# Patient Record
Sex: FEMALE | Race: WHITE | NOT HISPANIC OR LATINO | Employment: OTHER | URBAN - METROPOLITAN AREA
[De-identification: names, ages, dates, MRNs, and addresses within clinical notes are randomized per-mention and may not be internally consistent; named-entity substitution may affect disease eponyms.]

---

## 2019-12-06 ENCOUNTER — TELEPHONE (OUTPATIENT)
Dept: TRANSPLANT | Facility: CLINIC | Age: 49
End: 2019-12-06

## 2019-12-06 DIAGNOSIS — Z79.899 POLYPHARMACY: Primary | ICD-10-CM

## 2019-12-06 DIAGNOSIS — I27.9 CHRONIC PULMONARY HEART DISEASE: Primary | ICD-10-CM

## 2019-12-06 DIAGNOSIS — R06.82 TACHYPNEA: ICD-10-CM

## 2019-12-06 DIAGNOSIS — Z79.899 POLYPHARMACY: ICD-10-CM

## 2019-12-06 NOTE — TELEPHONE ENCOUNTER
----- Message from Selma Savage sent at 12/6/2019  8:28 AM CST -----  Contact:  Austin/ nurse   Please view message below.    Thanks,    Selma  ----- Message -----  From: Marycarmen Zimmer MA  Sent: 12/6/2019   8:12 AM CST  To: Selma Savage    Please call Austin at [phone] she need an appointment Dr. Seth Alexander referral the patient she will be a new patient Pulmonary Hypertension . Please call [phone]. Thank you.

## 2019-12-06 NOTE — TELEPHONE ENCOUNTER
Attempted to contact pt to advise of appointment, but voicemail is full. Appt slips were sent to JOY Avila, who will provide to patient, which include my direct contact info, should the patient have any questions.

## 2019-12-06 NOTE — TELEPHONE ENCOUNTER
"Returned call to Austin, who states referring physician's original plan was to transfer pt to [facility], as pt is currently hospitalized but "physician Dr Alexander (referring) spoke to said 'no'". Austin states that Dr Alexander was "very concerned" about pt, since her symptoms were "new and acute". Advised Austin that if pt is discharged and is still symptomatic, she could come to ER at [facility]. In meantime, Austin will fax records and we will arrange for PH Consult.   "

## 2019-12-16 PROBLEM — I10 ESSENTIAL HYPERTENSION: Status: ACTIVE | Noted: 2019-12-16

## 2019-12-16 PROBLEM — I27.20 PULMONARY HYPERTENSION: Status: ACTIVE | Noted: 2019-12-16

## 2019-12-16 PROBLEM — Z91.148 HISTORY OF MEDICATION NONCOMPLIANCE: Status: ACTIVE | Noted: 2019-12-16

## 2019-12-16 PROBLEM — F12.10 MARIJUANA ABUSE: Status: ACTIVE | Noted: 2019-12-16

## 2019-12-16 PROBLEM — F15.10 METHAMPHETAMINE USE: Status: ACTIVE | Noted: 2019-12-16

## 2019-12-16 PROBLEM — F41.8 OTHER SPECIFIED ANXIETY DISORDERS: Status: ACTIVE | Noted: 2019-12-16

## 2019-12-16 PROBLEM — Z72.0 TOBACCO USE: Status: ACTIVE | Noted: 2019-12-16

## 2019-12-16 NOTE — PROGRESS NOTES
Subjective:    Patient ID:  Selma Hartmann is a 49 y.o. female who presents for evaluation of Pulmonary Hypertension.    HPI   48 yo with methamphetamine use (urine tox + OSH 12/3/19), MJ use, tob use HTN, DM2, HLP, chart history of med noncompliance, h/o prescription opiate addiction, anxiety, h/o DVT after tubal ligation 12 yr ago,   Referred by Dr Alexander   For eval of PH    Pt reports she was working in the [employer] dept at [employer] and was having trouble lifting boxes and walking from once side of the store to the other- missed work and went to the doctor for a work excuse when she woke with swelling in her face, and he put her on diuretic. Was then admitted locally after an ER visit for SOB/swelling- given lopressor and HCTZ which made her feel worse (her bnp was over 2000 and bp was 180/140)- was started on lasix and starting urinating a lot. Was told she had PH at this time though she had not had a RHC yet. Was sent here for further eval-    reports she has lost a lot of fluid since her hosp stay- her face is less full, people have commented on how good she looks- taking 40mg lasix daily- doesn't urinate a lot any more though she did a lot when they first started it. Doesn't do a lot physically- was told not to lift over 5#, but able to get things done a lot faster and doesn't run out of breath like she was- was gasping before. No SOB getting dressed or showering now, though she was before. Was unable to walk the dollar store before- did not have to stop and catch her breath on the way to the lab, and on her 6mw only stopped once for a second or two. No CP, though at times she feels anxious. No swelling in her feet or ankles now though she had a lot of swelling before.    Had stroke in 2007- was to be started on bp med but didn't take it. 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 Had been using for the last 9 years and this is the longest she has been clean- says she has no desire to use, thinks she was self medicating- has kicked people out of her house who offered it to her.  Has held a cigarette 3 times since her hosp d/c- puffed but didn't inhale)  Relays on medicaid transport    + raynauds- says she tested + for lupus at the time of her stroke but then was told she didn't have it    Watching her Na    FH: F HTN  M HTN  S: crohns, HTN    Six Minute Walk Test:   335  m (   m in    )                                              O2 sat  92 ->90  %                                                           HR 92  -> 98                                                                 BP  117 / 74  ->120 /80                                                         Layla   0.5  -> 4    Echo      12/2/19  Normal LV size, LV thickness mild-mod increased, EF 55-60%  E/A reversal  RV severely dilated, D shaped septum  Normal LA  Mild ISSA  RVSP 110  No effusion  RAP 15    EKG  12 / 3   /19  NSR( I viewed tracing)  bettie  rward axis irbbb    RHC   /      /  n/a    CXR   /    /  (-) OSH    CT Chest  12  /  3  /   19  (-) PE, main PA 3.5 cm, normal lung parenchyma    PFTs    12 / 4 /19    FVC    3.3  L  98    % pred  FEV1   2.47 L   92  % pred  FEV1/FVC  75    %  TLC  3.99 L 84 %pred  DLCOadj   73  % pred     V/Q Scan  /    /  n/a    Review of Systems   Constitution: Positive for weight loss. Negative for chills, fever, malaise/fatigue and weight gain.   HENT: Negative.    Eyes: Negative.    Cardiovascular: Positive for dyspnea on exertion and leg swelling. Negative for chest pain, near-syncope, orthopnea, palpitations, paroxysmal nocturnal  "dyspnea and syncope.   Respiratory: Negative for cough and shortness of breath.    Endocrine: Negative.    Skin: Negative.    Musculoskeletal: Negative.    Gastrointestinal: Negative for bloating, abdominal pain and change in bowel habit.   Neurological: Negative for dizziness and light-headedness.   Psychiatric/Behavioral: Negative for depression. The patient is nervous/anxious.         Objective:  /73 (BP Location: Left arm, Patient Position: Sitting, BP Method: Large (Automatic))   Pulse 92   Ht 5' 3" (1.6 m)   Wt 73 kg (160 lb 15 oz)   BMI 28.51 kg/m²       Physical Exam   Constitutional: She is oriented to person, place, and time. She appears well-developed and well-nourished.   HENT:   Head: Normocephalic and atraumatic.   Eyes: Right eye exhibits no discharge. Left eye exhibits no discharge.   Neck: Neck supple. No JVD present. No thyromegaly present.   Cardiovascular: Regular rhythm. Exam reveals no gallop and no friction rub.   No murmur heard.  Mildly tachy, loud S 2   Pulmonary/Chest: Effort normal and breath sounds normal. No respiratory distress. She has no wheezes. She has no rales.   Abdominal: Soft. Bowel sounds are normal. She exhibits no distension. There is no tenderness.   Musculoskeletal: Normal range of motion. She exhibits edema. She exhibits no tenderness.   1+ pitting edema to sock line   Neurological: She is alert and oriented to person, place, and time. No cranial nerve deficit. Coordination normal.   Skin: Skin is warm and dry. No rash noted.   Psychiatric: She has a normal mood and affect. Judgment and thought content normal.           Chemistry        Component Value Date/Time     12/17/2019 1241    K 4.5 12/17/2019 1241     12/17/2019 1241    CO2 26 12/17/2019 1241    BUN 22 (H) 12/17/2019 1241    CREATININE 1.1 12/17/2019 1241     (H) 12/17/2019 1241        Component Value Date/Time    CALCIUM 10.0 12/17/2019 1241    ALKPHOS 117 12/17/2019 1241    AST 27 " 12/17/2019 1241    ALT 31 12/17/2019 1241    BILITOT 0.9 12/17/2019 1241    ESTGFRAFRICA >60.0 12/17/2019 1241    EGFRNONAA 59.1 (A) 12/17/2019 1241            Magnesium   Date Value Ref Range Status   12/17/2019 2.3 1.6 - 2.6 mg/dL Final       Lab Results   Component Value Date    WBC 9.39 12/17/2019    HGB 18.5 (H) 12/17/2019    HCT 56.6 (H) 12/17/2019    MCV 95 12/17/2019     12/17/2019       No results found for: INR, PROTIME    BNP   Date Value Ref Range Status   12/17/2019 43 0 - 99 pg/mL Final     Comment:     Values of less than 100 pg/ml are consistent with non-CHF populations.       No results found for: LDH          Assessment:       1. Pulmonary hypertension    2. Methamphetamine use    3. Marijuana abuse    4. Essential hypertension    5. History of medication noncompliance    6. Other specified anxiety disorders    7. Tobacco use    8. Type 2 diabetes mellitus without complication, without long-term current use of insulin    9. Mixed hyperlipidemia      WHO Group:1  Functional Class 3     Plan:     severe PH by echo with RVE and D shaped septum, has not yet had RHC to confirm dx. Most likely cause is methamphetamine use, however pt has raynauds making CTD another possibility.     Mild volume on exam today with normal BNP after initiation of lasix and systemic HTN now controlled.    PLAN: f/u with me week after Xmas for RHC, V/Q and lap panel to include rheum panel, HIV, TSH, hep panel    Strongly encouraged pt to to go to  to  Help her remain free of meth (as a colleague from [facility] who is a  )    Cont smoking cessation efforts as well    Keep salt intake to under 2000 mg sodium, fluids to under 2 L (64 oz)    Further recs will follow above testing- more likely than not will start with po pulmonary vasodilators as she is very high risk for parenteral therapy given her psychosocial issues.

## 2019-12-16 NOTE — H&P (VIEW-ONLY)
Subjective:    Patient ID:  Selma Hartmann is a 49 y.o. female who presents for evaluation of Pulmonary Hypertension.    HPI   50 yo with methamphetamine use (urine tox + OSH 12/3/19), MJ use, tob use HTN, DM2, HLP, chart history of med noncompliance, h/o prescription opiate addiction, anxiety, h/o DVT after tubal ligation 12 yr ago,   Referred by Dr Alexander   For eval of PH    Pt reports she was working in the [employer] dept at [facility] and was having trouble lifting boxes and walking from once side of the store to the other- missed work and went to the doctor for a work excuse when she woke with swelling in her face, and he put her on diuretic. Was then admitted locally after an ER visit for SOB/swelling- given lopressor and HCTZ which made her feel worse (her bnp was over 2000 and bp was 180/140)- was started on lasix and starting urinating a lot. Was told she had PH at this time though she had not had a RHC yet. Was sent here for further eval-    reports she has lost a lot of fluid since her hosp stay- her face is less full, people have commented on how good she looks- taking 40mg lasix daily- doesn't urinate a lot any more though she did a lot when they first started it. Doesn't do a lot physically- was told not to lift over 5#, but able to get things done a lot faster and doesn't run out of breath like she was- was gasping before. No SOB getting dressed or showering now, though she was before. Was unable to walk the dollar store before- did not have to stop and catch her breath on the way to the lab, and on her 6mw only stopped once for a second or two. No CP, though at times she feels anxious. No swelling in her feet or ankles now though she had a lot of swelling before.    Had stroke in 2007- was to be started on bp med but didn't take it. 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 Had been using for the last 9 years and this is the longest she has been clean- says she has no desire to use, thinks she was self medicating- has kicked people out of her house who offered it to her.  Has held a cigarette 3 times since her hosp d/c- puffed but didn't inhale)  Relays on medicaid transport    + raynauds- says she tested + for lupus at the time of her stroke but then was told she didn't have it    Watching her Na    FH: F HTN  M HTN  S: crohns, HTN    Six Minute Walk Test:   335  m (   m in    )                                              O2 sat  92 ->90  %                                                           HR 92  -> 98                                                                 BP  117 / 74  ->120 /80                                                         Layla   0.5  -> 4    Echo      12/2/19  Normal LV size, LV thickness mild-mod increased, EF 55-60%  E/A reversal  RV severely dilated, D shaped septum  Normal LA  Mild ISSA  RVSP 110  No effusion  RAP 15    EKG  12 / 3   /19  NSR( I viewed tracing)  bettie  rward axis irbbb    RHC   /      /  n/a    CXR   /    /  (-) OSH    CT Chest  12  /  3  /   19  (-) PE, main PA 3.5 cm, normal lung parenchyma    PFTs    12 / 4 /19    FVC    3.3  L  98    % pred  FEV1   2.47 L   92  % pred  FEV1/FVC  75    %  TLC  3.99 L 84 %pred  DLCOadj   73  % pred     V/Q Scan  /    /  n/a    Review of Systems   Constitution: Positive for weight loss. Negative for chills, fever, malaise/fatigue and weight gain.   HENT: Negative.    Eyes: Negative.    Cardiovascular: Positive for dyspnea on exertion and leg swelling. Negative for chest pain, near-syncope, orthopnea, palpitations, paroxysmal nocturnal  "dyspnea and syncope.   Respiratory: Negative for cough and shortness of breath.    Endocrine: Negative.    Skin: Negative.    Musculoskeletal: Negative.    Gastrointestinal: Negative for bloating, abdominal pain and change in bowel habit.   Neurological: Negative for dizziness and light-headedness.   Psychiatric/Behavioral: Negative for depression. The patient is nervous/anxious.         Objective:  /73 (BP Location: Left arm, Patient Position: Sitting, BP Method: Large (Automatic))   Pulse 92   Ht 5' 3" (1.6 m)   Wt 73 kg (160 lb 15 oz)   BMI 28.51 kg/m²       Physical Exam   Constitutional: She is oriented to person, place, and time. She appears well-developed and well-nourished.   HENT:   Head: Normocephalic and atraumatic.   Eyes: Right eye exhibits no discharge. Left eye exhibits no discharge.   Neck: Neck supple. No JVD present. No thyromegaly present.   Cardiovascular: Regular rhythm. Exam reveals no gallop and no friction rub.   No murmur heard.  Mildly tachy, loud S 2   Pulmonary/Chest: Effort normal and breath sounds normal. No respiratory distress. She has no wheezes. She has no rales.   Abdominal: Soft. Bowel sounds are normal. She exhibits no distension. There is no tenderness.   Musculoskeletal: Normal range of motion. She exhibits edema. She exhibits no tenderness.   1+ pitting edema to sock line   Neurological: She is alert and oriented to person, place, and time. No cranial nerve deficit. Coordination normal.   Skin: Skin is warm and dry. No rash noted.   Psychiatric: She has a normal mood and affect. Judgment and thought content normal.           Chemistry        Component Value Date/Time     12/17/2019 1241    K 4.5 12/17/2019 1241     12/17/2019 1241    CO2 26 12/17/2019 1241    BUN 22 (H) 12/17/2019 1241    CREATININE 1.1 12/17/2019 1241     (H) 12/17/2019 1241        Component Value Date/Time    CALCIUM 10.0 12/17/2019 1241    ALKPHOS 117 12/17/2019 1241    AST 27 " 12/17/2019 1241    ALT 31 12/17/2019 1241    BILITOT 0.9 12/17/2019 1241    ESTGFRAFRICA >60.0 12/17/2019 1241    EGFRNONAA 59.1 (A) 12/17/2019 1241            Magnesium   Date Value Ref Range Status   12/17/2019 2.3 1.6 - 2.6 mg/dL Final       Lab Results   Component Value Date    WBC 9.39 12/17/2019    HGB 18.5 (H) 12/17/2019    HCT 56.6 (H) 12/17/2019    MCV 95 12/17/2019     12/17/2019       No results found for: INR, PROTIME    BNP   Date Value Ref Range Status   12/17/2019 43 0 - 99 pg/mL Final     Comment:     Values of less than 100 pg/ml are consistent with non-CHF populations.       No results found for: LDH          Assessment:       1. Pulmonary hypertension    2. Methamphetamine use    3. Marijuana abuse    4. Essential hypertension    5. History of medication noncompliance    6. Other specified anxiety disorders    7. Tobacco use    8. Type 2 diabetes mellitus without complication, without long-term current use of insulin    9. Mixed hyperlipidemia      WHO Group:1  Functional Class 3     Plan:     severe PH by echo with RVE and D shaped septum, has not yet had RHC to confirm dx. Most likely cause is methamphetamine use, however pt has raynauds making CTD another possibility.     Mild volume on exam today with normal BNP after initiation of lasix and systemic HTN now controlled.    PLAN: f/u with me week after Xmas for RHC, V/Q and lap panel to include rheum panel, HIV, TSH, hep panel    Strongly encouraged pt to to go to  to  Help her remain free of meth (as a colleague from [facility] who is a  )    Cont smoking cessation efforts as well    Keep salt intake to under 2000 mg sodium, fluids to under 2 L (64 oz)    Further recs will follow above testing- more likely than not will start with po pulmonary vasodilators as she is very high risk for parenteral therapy given her psychosocial issues.

## 2019-12-17 ENCOUNTER — OFFICE VISIT (OUTPATIENT)
Dept: TRANSPLANT | Facility: CLINIC | Age: 49
End: 2019-12-17
Payer: MEDICAID

## 2019-12-17 ENCOUNTER — HOSPITAL ENCOUNTER (OUTPATIENT)
Dept: PULMONOLOGY | Facility: CLINIC | Age: 49
Discharge: HOME | End: 2019-12-17
Payer: MEDICAID

## 2019-12-17 VITALS
BODY MASS INDEX: 28.52 KG/M2 | HEIGHT: 63 IN | DIASTOLIC BLOOD PRESSURE: 73 MMHG | HEIGHT: 63 IN | WEIGHT: 160.94 LBS | WEIGHT: 155 LBS | HEART RATE: 92 BPM | BODY MASS INDEX: 27.46 KG/M2 | SYSTOLIC BLOOD PRESSURE: 104 MMHG

## 2019-12-17 DIAGNOSIS — Z72.0 TOBACCO USE: ICD-10-CM

## 2019-12-17 DIAGNOSIS — Z01.812 PRE-PROCEDURE LAB EXAM: Primary | ICD-10-CM

## 2019-12-17 DIAGNOSIS — I10 ESSENTIAL HYPERTENSION: ICD-10-CM

## 2019-12-17 DIAGNOSIS — F41.8 OTHER SPECIFIED ANXIETY DISORDERS: ICD-10-CM

## 2019-12-17 DIAGNOSIS — I73.00 RAYNAUD'S DISEASE WITHOUT GANGRENE: ICD-10-CM

## 2019-12-17 DIAGNOSIS — F12.10 MARIJUANA ABUSE: ICD-10-CM

## 2019-12-17 DIAGNOSIS — I27.20 PULMONARY HYPERTENSION: ICD-10-CM

## 2019-12-17 DIAGNOSIS — E11.9 TYPE 2 DIABETES MELLITUS WITHOUT COMPLICATION, WITHOUT LONG-TERM CURRENT USE OF INSULIN: ICD-10-CM

## 2019-12-17 DIAGNOSIS — E78.2 MIXED HYPERLIPIDEMIA: ICD-10-CM

## 2019-12-17 DIAGNOSIS — I27.9 CHRONIC PULMONARY HEART DISEASE: ICD-10-CM

## 2019-12-17 DIAGNOSIS — Z91.148 HISTORY OF MEDICATION NONCOMPLIANCE: ICD-10-CM

## 2019-12-17 DIAGNOSIS — F15.10 METHAMPHETAMINE USE: ICD-10-CM

## 2019-12-17 PROBLEM — E78.5 HYPERLIPIDEMIA: Status: ACTIVE | Noted: 2019-12-17

## 2019-12-17 PROCEDURE — 99205 PR OFFICE/OUTPT VISIT, NEW, LEVL V, 60-74 MIN: ICD-10-PCS | Mod: S$PBB,,, | Performed by: INTERNAL MEDICINE

## 2019-12-17 PROCEDURE — 94618 PULMONARY STRESS TESTING: ICD-10-PCS | Mod: 26,S$PBB,, | Performed by: INTERNAL MEDICINE

## 2019-12-17 PROCEDURE — 99205 OFFICE O/P NEW HI 60 MIN: CPT | Mod: S$PBB,,, | Performed by: INTERNAL MEDICINE

## 2019-12-17 PROCEDURE — 94618 PULMONARY STRESS TESTING: CPT | Mod: 26,S$PBB,, | Performed by: INTERNAL MEDICINE

## 2019-12-17 PROCEDURE — 99999 PR PBB SHADOW E&M-EST. PATIENT-LVL V: CPT | Mod: PBBFAC,,, | Performed by: INTERNAL MEDICINE

## 2019-12-17 PROCEDURE — 94618 PULMONARY STRESS TESTING: CPT | Mod: PBBFAC | Performed by: INTERNAL MEDICINE

## 2019-12-17 PROCEDURE — 99999 PR PBB SHADOW E&M-EST. PATIENT-LVL V: ICD-10-PCS | Mod: PBBFAC,,, | Performed by: INTERNAL MEDICINE

## 2019-12-17 PROCEDURE — 99215 OFFICE O/P EST HI 40 MIN: CPT | Mod: PBBFAC,25 | Performed by: INTERNAL MEDICINE

## 2019-12-17 RX ORDER — NIFEDIPINE 60 MG/1
60 TABLET, EXTENDED RELEASE ORAL DAILY
COMMUNITY
Start: 2019-12-06 | End: 2020-02-18 | Stop reason: ALTCHOICE

## 2019-12-17 RX ORDER — LISINOPRIL 20 MG/1
20 TABLET ORAL DAILY
COMMUNITY
Start: 2019-12-06 | End: 2020-03-20 | Stop reason: SDUPTHER

## 2019-12-17 RX ORDER — FUROSEMIDE 40 MG/1
40 TABLET ORAL DAILY
COMMUNITY
Start: 2019-12-06

## 2019-12-17 RX ORDER — ACETAMINOPHEN 325 MG/1
325 TABLET ORAL EVERY 6 HOURS PRN
COMMUNITY
End: 2020-07-22

## 2019-12-17 RX ORDER — ZOLPIDEM TARTRATE 5 MG/1
5 TABLET ORAL NIGHTLY
COMMUNITY
Start: 2019-12-06 | End: 2023-04-25

## 2019-12-17 RX ORDER — ASPIRIN 81 MG/1
81 TABLET ORAL DAILY
COMMUNITY
End: 2023-09-21

## 2019-12-17 NOTE — PATIENT INSTRUCTIONS
We will do a right heart catheterization to measure the blood pressure in your lungs-  Take your usual medicines and eat a light breakfast that am.  Labs on 2nd floor- then go to third floor cath lab waiting area and check in    We will do a ventilation perfusion scan the same day    Keep working on staying clean, i'm so proud of you- really think about going to NA, I think it will help!    Keep salt intake to under 2000 mg sodium, fluids to under 2 L (64 oz)    Call us if you find yourself getting more short of breath, have more swelling or unexpected weight changes    PH on Facebook: A couple of our patients created a group on Facebook for people living in [place] with PH, it's called [place] [facility].    Check it out!

## 2019-12-17 NOTE — LETTER
December 17, 2019        LESA SOMMERS  [address] [place] [address]             [facility] [address]  [facility] [address]  Phone: [phone]   Patient: Selma Hartmann   MR Number: [id number]   YOB: 1970   Date of Visit: 12/17/2019       Dear Dr. Sommers:    Thank you for referring Selma Hartmann to me for evaluation. Attached you will find relevant portions of my assessment and plan of care.    If you have questions, please do not hesitate to call me. I look forward to following Selma Hartmann along with you.    Sincerely,      Palma Herrera MD            CC  No Recipients    Enclosure

## 2019-12-18 NOTE — PROCEDURES
Selma Hartmann is a 49 y.o.  female patient, who presents for a 6 minute walk test ordered by MD Sharon.  The diagnosis is Pulmonary Hypertension, Qualify for Oxygen.  The patient's BMI is 27.5 kg/m2.  Predicted distance (lower limit of normal) is 420.73 meters.      Test Results:    The test was completed with stops.  The patient stopped 1 times for a total of 16 seconds.  The total time walked was 344 seconds.  During walking, the patient reported:  Dyspnea. The patient used no assistive devices  during testing.     12/17/2019---------Distance: 335.28 meters (1100 feet)     O2 Sat % Supplemental Oxygen Heart Rate Blood Pressure Layla Scale   Pre-exercise  (Resting) 92 % Room Air 92 bpm 117/74 mmHg 0.5   During Exercise 90 % Room Air 98 bpm 120/80 mmHg 4   Post-exercise  (Recovery) 92 % Room Air  91 bpm   mmHg       Recovery Time: 154 seconds    Performing nurse/tech: Mikhail DON      PREVIOUS STUDY:   The patient has not had a previous study.      CLINICAL INTERPRETATION:  Six minute walk distance is 335.28 meters (1100 feet) with somewhat heavy dyspnea.  During exercise, there was no significant desaturation while breathing room air.  Both blood pressure and heart rate remained stable with walking.  The patient did not report non-pulmonary symptoms during exercise.  No previous study performed.  Based upon age and body mass index, exercise capacity is less than predicted.

## 2019-12-19 ENCOUNTER — TELEPHONE (OUTPATIENT)
Dept: TRANSPLANT | Facility: CLINIC | Age: 49
End: 2019-12-19

## 2019-12-19 NOTE — TELEPHONE ENCOUNTER
----- Message from Mary Quiroz sent at 12/19/2019 10:45 AM CST -----  Contact: pt  Pt would like a call in ref to her procedure appt, and she states she is suppose to have labs before.    Thanks

## 2019-12-23 ENCOUNTER — HOSPITAL ENCOUNTER (OUTPATIENT)
Facility: HOSPITAL | Age: 49
Discharge: HOME | End: 2019-12-23
Attending: INTERNAL MEDICINE | Admitting: INTERNAL MEDICINE
Payer: MEDICAID

## 2019-12-23 DIAGNOSIS — I27.20 PULMONARY HYPERTENSION: ICD-10-CM

## 2019-12-23 PROCEDURE — 93451 RIGHT HEART CATH: CPT | Mod: 26,,, | Performed by: INTERNAL MEDICINE

## 2019-12-23 PROCEDURE — 25000003 PHARM REV CODE 250: Performed by: INTERNAL MEDICINE

## 2019-12-23 PROCEDURE — 99900035 HC TECH TIME PER 15 MIN (STAT)

## 2019-12-23 PROCEDURE — 93463 DRUG ADMIN & HEMODYNMIC MEAS: CPT

## 2019-12-23 PROCEDURE — C1894 INTRO/SHEATH, NON-LASER: HCPCS | Performed by: INTERNAL MEDICINE

## 2019-12-23 PROCEDURE — 93463 PR MEDICATION ADMIN & HEMODYNAMIC MEASURMENT: ICD-10-PCS | Mod: ,,, | Performed by: INTERNAL MEDICINE

## 2019-12-23 PROCEDURE — 93463 DRUG ADMIN & HEMODYNMIC MEAS: CPT | Mod: ,,, | Performed by: INTERNAL MEDICINE

## 2019-12-23 PROCEDURE — 93451 PR RIGHT HEART CATH O2 SATURATION & CARDIAC OUTPUT: ICD-10-PCS | Mod: 26,,, | Performed by: INTERNAL MEDICINE

## 2019-12-23 PROCEDURE — 93451 RIGHT HEART CATH: CPT | Performed by: INTERNAL MEDICINE

## 2019-12-23 PROCEDURE — C1751 CATH, INF, PER/CENT/MIDLINE: HCPCS | Performed by: INTERNAL MEDICINE

## 2019-12-23 PROCEDURE — 93463 DRUG ADMIN & HEMODYNMIC MEAS: CPT | Performed by: INTERNAL MEDICINE

## 2019-12-23 RX ORDER — SODIUM CHLORIDE 0.9 G/100ML
IRRIGANT IRRIGATION
Status: DISCONTINUED | OUTPATIENT
Start: 2019-12-23 | End: 2019-12-23 | Stop reason: HOSPADM

## 2019-12-23 RX ORDER — LIDOCAINE HYDROCHLORIDE 20 MG/ML
INJECTION, SOLUTION INFILTRATION; PERINEURAL
Status: DISCONTINUED | OUTPATIENT
Start: 2019-12-23 | End: 2019-12-23 | Stop reason: HOSPADM

## 2019-12-23 NOTE — Clinical Note
The PA catheter is repositioned to the main pulmonary artery. Hemodynamics performed. Cardiac output obtained at 4 L/min. O2 saturation measured at 69%.

## 2019-12-23 NOTE — DISCHARGE INSTRUCTIONS
We are going to start you on adempas 1 mg three times a day.     The medicine will come to you through a specialty pharmacy that will ship the drug to  Your house and a specialty pharmacy nurse will come check on you as we increase the dose.    Take it with food to reduce indigestion.      A couple of weeks later we will start another medicine for the pulmonary hypertension called opsumit. Take one 10mg tablet once a day with your morning medicines.      AFTER THE PROCEDURE:  -You may remove the bandage in 24 hours and wash with soap and water.  -You may shower, but do not soak in a tub for three days.   PRECAUTIONS FOR THE NEXT 24 HOURS:  -If you need to cough, sneeze, have a bowel movement, or bear down, hold pressure over your bandage.  -Do not  anything heavier than a gallon of milk(about 5 pounds)  -Avoid excessive bending over.  SYMPTOMS TO WATCH FOR AND REPORT TO YOUR DOCTOR:  -BLEEDING: hold pressure over the site until bleeding stops. Proceed to Emergency Room by ambulance (do not drive yourself) if unable to stop bleeding. Notify your doctor.  -HEMATOMA(hard bruise under the skin): Narciso around the bruise if one develops. Call your doctor if it increases in size or if you have difficulty talking, swallowing, breathing or anything unusual.  SIGNS OF INFECTION:Fever (temperature over 100.5 F), pus or redness  -RASH  -CHEST PAIN OR SHORTNESS OF BREATH    You may call you coordinator in the Heart Failure/Heart Transplant/Pulmonary Hypertension Clinic at [phone] during normal business hours(Monday through Friday from 8 A.M. to 5 P.M.) After hours, call the Heart Transplant Service doctor on call at [phone].

## 2019-12-23 NOTE — INTERVAL H&P NOTE
The patient has been examined and the H&P has been reviewed:    Here for RHC to assess PAP.     RHC R IJ, 7 Fr sheath with local lidocaine, micropuncture kit and US guidance.    I have explained the risks, benefits and alternatives of the procedure in detail. The patient voices understanding and all questions have been answered,. The patient agrees to proceed as planned.        There are no hospital problems to display for this patient.

## 2019-12-23 NOTE — DISCHARGE SUMMARY
Date of admit to cath lab: 12/23/2019      Date of discharge from cath lab: 12/23/2019      Principal diagnosis: PH    Discharge attending physician: Palma Herrera MD    Hospital Course/Outcome of the treatment, procedures or surgery: Pt admitted for RHC.   See CVIS/cath lab procedure report in EPIC  for full report of today's procedure.    Disposition of the case (d/c disposition): home    Discharge Medication List: see med card    Plan for follow up care, diet, activity level: F/U as scheduled. Resume low Na diet.  Activity as tolerated    Condition on discharge from Cath lab: Stable.

## 2019-12-24 ENCOUNTER — TELEPHONE (OUTPATIENT)
Dept: TRANSPLANT | Facility: CLINIC | Age: 49
End: 2019-12-24

## 2019-12-24 NOTE — TELEPHONE ENCOUNTER
Late entry for 12/23:  Spoke to pt and SO regarding initiation of Adempas. The following items were discussed:    This medication is one approach to help bring down your pulmonary pressures (pulmonary hypertension, or high blood pressure in your lungs). The  has a great website about the medication, but here are a few things to know about it:     1. The Adempas will be supplied to you by a specialty pharmacy. I will send the referral for your medication to the  of the medication ([facility]) and they will contact you and eventually send the medication referral to the pharmacy. They will tell you about the copay for the med, if there is one at all. If it is too high for you, please let them know this. They can triage you to patient assistance programs, but they can't do so unless you say something.     2. After the medication is shipped to you, please do NOT start it right away. Please wait to hear from a specialty pharmacy nurse (likely from either [facility] or [facility], but possibly [facility]), who will set up an appointment with you to come to your home to do teaching and assess you for readiness to start the medication (blood pressure, etc). Every few weeks, the nurse will make a visit with you to see how you are doing, and to determine if we can increase the dose. The tablet strengths are 0.5mg, 1.0 mg, 1.5 mg, 2.0 mg and 2.5 mg. You will likely start at 1mg. Once you've reached the goal dose, you won't likely need additional visits from the nurse, except for maintenance visits.     3. Possible side effects might include low blood pressure, headache, indigestion, dizziness, nosebleeds. Some people don't experience any side effects at all. Please call us with anything that is concerning for you. If you need to take an antacid, please take it one hour before or one hour after the Adempas, as they can lessen the effect of Adempas.     4. If you are ever hospitalized at a facility other  "than [facility] while taking Adempas, you will need to bring the medication with you, in case it is not on their formulary. Missing more than 3 days of the medication means having to restart at the lowest dose and titrate up again.     5. The specialty pharmacy will need to make contact with you monthly to arrange shipment of the medication.     Pt was provided w/my direct contact info, as well as Adempas informational bookelt and med guide. She was also given info on [place] [facility] (lives about 1 1/2 hours away) and info on the PHA. She is not of childbearing potential (considered post-menopausal). All questions/concerns answered addressed to pt satisfaction. Of note, this RN also spoke w/pt's father via telephone (w/pt permission),  who expressed concern about pt and requested she be admitted for "observartion". It was noted to pt's dad that pt was deemed clinically stable, per Dr Herrera, for discharge. He requested this RN ask Dr Herrera to call him, which was done, but without promise that she would be able, given her schedule in the procedure room. Pt expressed full understanding, as did her father.      "

## 2019-12-27 ENCOUNTER — TELEPHONE (OUTPATIENT)
Dept: TRANSPLANT | Facility: CLINIC | Age: 49
End: 2019-12-27

## 2019-12-27 DIAGNOSIS — I27.9 CHRONIC PULMONARY HEART DISEASE: Primary | ICD-10-CM

## 2019-12-27 NOTE — TELEPHONE ENCOUNTER
Request received from insurance company for pt's medical records/STD. This was forwarded to medical records dept for fulfillment.

## 2019-12-27 NOTE — TELEPHONE ENCOUNTER
----- Message from Rona Thorne sent at 12/27/2019 11:16 AM CST -----  I left her a message to call back. Thanks.     ----- Message -----  From: LATRICIA GunterN, RN  Sent: 12/26/2019   2:09 PM CST  To: [facility] Transplant Schedulers    Hi-  When possible, please contact pt to schedule PH f/u w/Dr Herrera in about 3 months, to include labs, walk, and the VQ scan pt missed earlier this week.  Thank you,  Chasidy

## 2019-12-30 ENCOUNTER — PATIENT MESSAGE (OUTPATIENT)
Dept: TRANSPLANT | Facility: CLINIC | Age: 49
End: 2019-12-30

## 2019-12-30 NOTE — TELEPHONE ENCOUNTER
Appeal letter for patient's denied Adempas was submitted to insurance, with request for expedited review.

## 2019-12-30 NOTE — TELEPHONE ENCOUNTER
Angelo w/Chanell at [facility] who reports she has contacted pt to sign consent to proceed w/appeal on denial of Adempas. Pt will receive form via mail, sign, and send back to insurance.

## 2019-12-31 ENCOUNTER — TELEPHONE (OUTPATIENT)
Dept: TRANSPLANT | Facility: CLINIC | Age: 49
End: 2019-12-31

## 2020-01-06 ENCOUNTER — PATIENT MESSAGE (OUTPATIENT)
Dept: TRANSPLANT | Facility: CLINIC | Age: 50
End: 2020-01-06

## 2020-01-06 ENCOUNTER — TELEPHONE (OUTPATIENT)
Dept: TRANSPLANT | Facility: CLINIC | Age: 50
End: 2020-01-06

## 2020-01-06 NOTE — TELEPHONE ENCOUNTER
"Returned call to pt, who reports she received notification from STD insurance that "Dr Herrera signed off for me to return to work" and she was surprised by this, because Dr Herrera said there was "no way" she could go back for a while during her last clinic appointment. Pt states "I might be ok with going back if she will let me." Message sent to Dr Herrera regarding same. Informed pt we would update her, once new info is available.  "

## 2020-01-06 NOTE — TELEPHONE ENCOUNTER
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  ----- Message -----  From: Palma Herrera MD  Sent: 1/6/2020  12:51 PM CST  To: JENNIFER Gunter, RN    I did not sign off on this (for god sakes i've been on vacation since I saw her lol)    I did not get any papers for her period.    ----- Message -----  From: JENNIFER Gunter, RN  Sent: 1/6/2020  11:44 AM CST  To: Palma Herrera MD    Hi-  Please see below. Pt inquiring if you signed off on her returning to work? 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  Thank youChasidy   ----- Message -----  From: JENNIFER Gunter, RN  Sent: 1/6/2020  11:35 AM CST  To: JENNIFER Gunter, RN    Chasidy     Pt states she received information from her Short Term Disability that she has been cleared to go back to work and says she knew nothing about it.  She can be reached at [phone].     Thank you           "

## 2020-01-06 NOTE — TELEPHONE ENCOUNTER
----- Message from JENNIFER Gunter, RN sent at 1/6/2020 11:35 AM CST -----  Chasidy     Pt states she received information from her Short Term Disability that she has been cleared to go back to work and says she knew nothing about it.  She can be reached at [phone].     Thank you

## 2020-01-08 ENCOUNTER — TELEPHONE (OUTPATIENT)
Dept: TRANSPLANT | Facility: CLINIC | Age: 50
End: 2020-01-08

## 2020-01-08 NOTE — TELEPHONE ENCOUNTER
Spoke w/Chanell ([phone]), at [facility], to get update on pt's appeal on denial for Adempas. She reports that they still have not yet received pt's consent for us to submit appeal. Will f/u w/patient.

## 2020-01-08 NOTE — TELEPHONE ENCOUNTER
"Pt reports "My short term disability is saying somebody at [facility] told them I could go back to work, but this is not what Dr Herrera said." Pt notes she would be ok returning to work, but Dr Herrera has okayed only light duty ( only). Asked pt to have [employer] fax us notification of decision, and then this could be forwarded to either Disability Desk at [facility], or letter sent from our office, stating pt needs light duty only. Pt verbalized understanding/agreement.  "

## 2020-01-09 ENCOUNTER — PATIENT MESSAGE (OUTPATIENT)
Dept: TRANSPLANT | Facility: CLINIC | Age: 50
End: 2020-01-09

## 2020-01-09 ENCOUNTER — DOCUMENTATION ONLY (OUTPATIENT)
Dept: TRANSPLANT | Facility: HOSPITAL | Age: 50
End: 2020-01-09

## 2020-01-09 NOTE — PROGRESS NOTES
Pt unable to lift >10# due to her PH. Should sit and rest if short of breath, lightheaded or chest pain occurs.    Unclear when pt will be able to return to full duty- may return to light duty now if she able to work with above restrictions.

## 2020-01-14 ENCOUNTER — TELEPHONE (OUTPATIENT)
Dept: TRANSPLANT | Facility: CLINIC | Age: 50
End: 2020-01-14

## 2020-01-14 NOTE — TELEPHONE ENCOUNTER
Angelo hernandez/Chanell at [facility]--Appeal for Adempas has been nurse reviewed and was sent to physician reviewer yesterday (1/13/20).

## 2020-01-15 ENCOUNTER — PATIENT MESSAGE (OUTPATIENT)
Dept: TRANSPLANT | Facility: CLINIC | Age: 50
End: 2020-01-15

## 2020-01-16 ENCOUNTER — PATIENT MESSAGE (OUTPATIENT)
Dept: TRANSPLANT | Facility: CLINIC | Age: 50
End: 2020-01-16

## 2020-01-16 DIAGNOSIS — Z79.899 POLYPHARMACY: Primary | ICD-10-CM

## 2020-01-20 ENCOUNTER — TELEPHONE (OUTPATIENT)
Dept: TRANSPLANT | Facility: CLINIC | Age: 50
End: 2020-01-20

## 2020-01-20 NOTE — TELEPHONE ENCOUNTER
Left  merissa Chauhan, at [facility], requesting update on patient's appeal for denial of Adempas.

## 2020-01-21 ENCOUNTER — PATIENT MESSAGE (OUTPATIENT)
Dept: TRANSPLANT | Facility: CLINIC | Age: 50
End: 2020-01-21

## 2020-01-21 ENCOUNTER — TELEPHONE (OUTPATIENT)
Dept: TRANSPLANT | Facility: CLINIC | Age: 50
End: 2020-01-21

## 2020-01-21 NOTE — TELEPHONE ENCOUNTER
Received call from Chanell at [facility]. The appeal on denial of patient's Adempas was also denied. Once second denial is received, will send to [place] at [facility]. Pt should then be triaged to patient assistance.

## 2020-01-29 ENCOUNTER — PATIENT MESSAGE (OUTPATIENT)
Dept: TRANSPLANT | Facility: CLINIC | Age: 50
End: 2020-01-29

## 2020-02-04 ENCOUNTER — PATIENT MESSAGE (OUTPATIENT)
Dept: TRANSPLANT | Facility: CLINIC | Age: 50
End: 2020-02-04

## 2020-02-17 ENCOUNTER — PATIENT MESSAGE (OUTPATIENT)
Dept: TRANSPLANT | Facility: CLINIC | Age: 50
End: 2020-02-17

## 2020-02-17 ENCOUNTER — TELEPHONE (OUTPATIENT)
Dept: TRANSPLANT | Facility: CLINIC | Age: 50
End: 2020-02-17

## 2020-02-17 NOTE — TELEPHONE ENCOUNTER
Returned call to Lizette, RN with Adempas, who reports pt started Adempas 1mg yesterday. She took AM dose, but skipped midday because SBP was 95. After jake dose yesterday, BPs were 118/79 and 114/66. Message sent to Dr Herrera to determine if pt's BP meds should be adjusted.Message sent to pt regarding all.

## 2020-02-17 NOTE — TELEPHONE ENCOUNTER
----- Message from JENNIFER Gunter, RN sent at 2/17/2020 10:56 AM CST -----   Pt Advice   Message Contents   Corine EVANS Staff  Caller: pt (Today, 10:52 AM)         Pt would like to be called back regarding     Pt can be reached [phone]

## 2020-02-17 NOTE — TELEPHONE ENCOUNTER
Spoke w/pt, who reports Lizette RN with [facility], told her today to D/C her BP meds and continue Adempas. Pt already took Adempas this AM. She will monitor BP today, and contact me prior to when midday dose is due, to report BP readings. Message sent to Dr Herrera regarding all.

## 2020-02-18 ENCOUNTER — TELEPHONE (OUTPATIENT)
Dept: TRANSPLANT | Facility: CLINIC | Age: 50
End: 2020-02-18

## 2020-02-18 ENCOUNTER — PATIENT MESSAGE (OUTPATIENT)
Dept: TRANSPLANT | Facility: CLINIC | Age: 50
End: 2020-02-18

## 2020-02-18 NOTE — TELEPHONE ENCOUNTER
----- Message from Palma Herrera MD sent at 2/17/2020  4:18 PM CST -----  Regarding: RE: low BP w/Adempas  Continue lisinopril as she has DM    Can stop nifedipine      ----- Message -----  From: JENNIFER Gunter, RN  Sent: 2/17/2020  10:36 AM CST  To: Palma Herrera MD  Subject: low BP w/Adempas                                 Pt was finally started on Adempas yesterday. BP lowish at 95 so skipped midday dose yesterday, but took PM. PM BP was 118/79 and 114/66. Want to adjust lisinopril or other?    Edited to add: I just spoke w/pt, and she says she was instructed by [facility] RN to continue her Adempas, but STOP both BP meds. I asked pt to check BP throughout day today, and keep me posted. Does this sound ok?    Chasidy

## 2020-02-18 NOTE — TELEPHONE ENCOUNTER
MD Chasidy Ford, LATRICIAN, RN             I took dm off problem list     Ok to hold both then :D      Notified pt via [facility].

## 2020-02-18 NOTE — TELEPHONE ENCOUNTER
Per VM from MALINDA Bauer, she did not tell pt to D/C medications. It is possible pt misunderstood info that was provided.

## 2020-02-18 NOTE — TELEPHONE ENCOUNTER
Left VM for patient and will also message via [facility] that she should D/C nifedipine, continue lisinopril, and only hold adempas if SBP 95 or lower. Left VM for Lizette RN with [facility], regarding same.

## 2020-02-18 NOTE — TELEPHONE ENCOUNTER
Spoke w/patient, who reports she took both BP meds this AM. Explained that she should D/c nifedipine (pt reports she had never actually taken this, anyhow) and that she is NOT diabetic. Message sent to Dr Herrera, requesting removal from problem list. Pt will not take AM Adempas, since she already took BP med. She will wait until midday dose, then check BP. She understands if SBP 95 or greater, she should take Adempas. Will continue to monitor.

## 2020-02-20 ENCOUNTER — PATIENT MESSAGE (OUTPATIENT)
Dept: TRANSPLANT | Facility: CLINIC | Age: 50
End: 2020-02-20

## 2020-02-28 ENCOUNTER — TELEPHONE (OUTPATIENT)
Dept: TRANSPLANT | Facility: CLINIC | Age: 50
End: 2020-02-28

## 2020-02-28 NOTE — TELEPHONE ENCOUNTER
"[facility]. Pt doing well, w//59 asymptomatic. Will titrate to 1.5mg. Pt admits to having difficulty taking mid-day dose. She will try setting phone alarm, and feels this might work, "I carry my phone all the time."     Pt also reports she had sleep study about a month ago, and had 14 apneic episodes. She returns to sleep clinic this weekend to do trial of CPAP. Will try to obtain report from Dr Hinton's office in [place].  "

## 2020-03-04 ENCOUNTER — TELEPHONE (OUTPATIENT)
Dept: TRANSPLANT | Facility: CLINIC | Age: 50
End: 2020-03-04

## 2020-03-04 NOTE — TELEPHONE ENCOUNTER
Returned call to patient. She is concerned because STD ends on 3/13, and she will be without income. Pt is looking into returning to work versus LTD. Requests data from diagnostics to give to [employer], who she says is screening her for LTD. Asked pt to have insurance fax written request for info, as this will likely need to be turned over to DisabilityDesk at [facility].

## 2020-03-17 ENCOUNTER — PATIENT MESSAGE (OUTPATIENT)
Dept: TRANSPLANT | Facility: CLINIC | Age: 50
End: 2020-03-17

## 2020-03-19 ENCOUNTER — TELEPHONE (OUTPATIENT)
Dept: TRANSPLANT | Facility: CLINIC | Age: 50
End: 2020-03-19

## 2020-03-19 NOTE — TELEPHONE ENCOUNTER
"Returned call to patient, who reports she went to urgent care today w/s/s of UTI. She was confirmed to have UTI, and given "antibiotic shot" in buttocks. Pt reports in urgent care, her BP was 137/77, so she took her Adempas 1.5 when she returned home (around 3). She felt a little dizzy, took her BP around 4 PM, and it was 80/50, then gradually increased to 88/52, and was most recently 92/56. Pt reports she feels fine at this time, and denies any issues. She is also afebrile.    Pt was supposed to increase to 2mg Adempas next week, but she has extra 1.5mg tabs, so will likely stay at this dose for now. Pt is set up to have video visit for clinic tomorrow, and we will discuss plan at that time.     For tonight, pt was instructed to take BP, and if SBP , she will hold dose of Adempas tonight, and reassess BP in AM. Pt understands reasons to go to ER (if dizzy w/low BP again, losing consciousness, etc), or high fever (greater than 100.4, non-responsive to tylenol). Pt has [facility] on call number as well.  "

## 2020-03-20 ENCOUNTER — OFFICE VISIT (OUTPATIENT)
Dept: TRANSPLANT | Facility: CLINIC | Age: 50
End: 2020-03-20
Payer: MEDICAID

## 2020-03-20 VITALS
HEART RATE: 86 BPM | BODY MASS INDEX: 28.34 KG/M2 | WEIGHT: 160 LBS | DIASTOLIC BLOOD PRESSURE: 74 MMHG | SYSTOLIC BLOOD PRESSURE: 116 MMHG

## 2020-03-20 DIAGNOSIS — E78.2 MIXED HYPERLIPIDEMIA: ICD-10-CM

## 2020-03-20 DIAGNOSIS — I10 ESSENTIAL HYPERTENSION: ICD-10-CM

## 2020-03-20 DIAGNOSIS — I27.20 PULMONARY HYPERTENSION: Primary | ICD-10-CM

## 2020-03-20 DIAGNOSIS — I73.00 RAYNAUD'S DISEASE WITHOUT GANGRENE: ICD-10-CM

## 2020-03-20 DIAGNOSIS — I27.9 CHRONIC PULMONARY HEART DISEASE: ICD-10-CM

## 2020-03-20 DIAGNOSIS — F12.10 MARIJUANA ABUSE: ICD-10-CM

## 2020-03-20 DIAGNOSIS — Z91.148 HISTORY OF MEDICATION NONCOMPLIANCE: ICD-10-CM

## 2020-03-20 DIAGNOSIS — Z72.0 TOBACCO USE: ICD-10-CM

## 2020-03-20 DIAGNOSIS — F15.10 METHAMPHETAMINE USE: ICD-10-CM

## 2020-03-20 PROCEDURE — 99214 OFFICE O/P EST MOD 30 MIN: CPT | Mod: 95,,, | Performed by: INTERNAL MEDICINE

## 2020-03-20 PROCEDURE — 99214 PR OFFICE/OUTPT VISIT, EST, LEVL IV, 30-39 MIN: ICD-10-PCS | Mod: 95,,, | Performed by: INTERNAL MEDICINE

## 2020-03-20 RX ORDER — LISINOPRIL 10 MG/1
10 TABLET ORAL DAILY
Qty: 90 TABLET | Refills: 3 | Status: SHIPPED | OUTPATIENT
Start: 2020-03-20 | End: 2020-03-20

## 2020-03-20 RX ORDER — PHENAZOPYRIDINE HYDROCHLORIDE 100 MG/1
100 TABLET, FILM COATED ORAL 3 TIMES DAILY PRN
COMMUNITY
End: 2020-07-22

## 2020-03-20 RX ORDER — NITROFURANTOIN (MACROCRYSTALS) 100 MG/1
100 CAPSULE ORAL 2 TIMES DAILY
COMMUNITY
End: 2020-07-22

## 2020-03-20 RX ORDER — NIFEDIPINE 60 MG/1
60 TABLET, EXTENDED RELEASE ORAL DAILY
Qty: 30 TABLET | Refills: 11 | Status: SHIPPED | OUTPATIENT
Start: 2020-03-20 | End: 2021-03-29 | Stop reason: SDUPTHER

## 2020-03-20 NOTE — PROGRESS NOTES
Subjective:    Patient ID:  Selma Hartmann is a 49 y.o. female who presents for follow-up of Pulmonary Hypertension.    The patient location is:home  The chief complaint leading to consultation is: PH f/u  Visit type: Virtual visit with synchronous audio and video  Total time spent with patient: 20 min  Each patient to whom he or she provides medical services by telemedicine is:  (1) informed of the relationship between the physician and patient and the respective role of any other health care provider with respect to management of the patient; and (2) notified that he or she may decline to receive medical services by telemedicine and may withdraw from such care at any time.        HPI   50 yo with methamphetamine use (urine tox + OSH 12/3/19), MJ use, tob use HTN, HLP, chart history of med noncompliance, h/o prescription opiate addiction, anxiety, h/o DVT after tubal ligation 12 yr ago,   Referred by Dr Alexander   For eval of PH  Had stroke in 2007- was to be started on bp med but didn't take it. Was brainstem, had to learn to walk/talk again- was clean for 3 yr at this time, as well as when she was pregnant with her 12 year old son and the first 3 yr of his life.      This is a virtual visit due to coronavirus outbreak    Went to urgent care yest with UTI sx and was given a shot of rocephin and wound up hypotensive in the ED- they prescribed macrobid instead. Reports she is off nifedpine. Woke up puffy this am after getting 2 bags of fluid last night in the ED.  Takes lisinopril 20mg in am, with her lasix, am adempas (1.5mg) and ASA, pepcid or prilosec.  She has been doing very well, would like to take vistaril but was worried about drug interaction. The lasix did help get rid of some of the fluid this am though she still see a little puffiness around her eyes. Has had a good bit of reflux since starting adempas.   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 Had been using for the last 9 years and this is the longest she has been clean- says she has no desire to use, thinks she was self medicating- has kicked people out of her house who offered it to her.  Has held a cigarette 3 times since her hosp d/c- puffed but didn't inhale)  Relays on medicaid transport    + raynauds- says she tested + for lupus at the time of her stroke but then was told she didn't have it    Watching her Na    FH: F HTN  M HTN  S: crohns, HTN    Six Minute Walk Test:   335  m (   m in    )                                              O2 sat  92 ->90  %                                                           HR 92  -> 98                                                                 BP  117 / 74  ->120 /80                                                         Layla   0.5  -> 4    RHC 12/23/19  · Severe Pulmonary arterial hypertension (treatment naive)  · Normal right and left-sided filling pressures.  · Normal cardiac output and index by Mil method.  · No response to inhaled nitric oxide.  · RA: 8/ 7/ 5 RV: 75/ -5/ 4 PA: 83/ 35/ 51 PWP: 12/ 0/ 10 . Cardiac output was 4.35 by Mil. Cardiac index is 2.44 L/min/m2. O2 Sat: PA 69%. Pulmonary vascular resistance: 9.4. /85 AO sat (92%) Bryanna 20 ppm PA 78/32 (47) 40 ppm PA 72/30 (45) 80 ppm PA 72/30 (45)         Echo      12/2/19  Normal LV size, LV thickness mild-mod increased, EF 55-60%  E/A reversal  RV severely dilated, D shaped septum  Normal LA  Mild ISSA  RVSP 110  No effusion  RAP 15    EKG  12 / 3   /19  NSR( I viewed tracing)  bettie  rward axis  irbbb        CXR   /    /  (-) OSH    CT Chest  12  /  3  /   19  (-) PE, main PA 3.5 cm, normal lung parenchyma    PFTs    12 / 4 /19    FVC    3.3  L  98    % pred  FEV1   2.47 L   92  % pred  FEV1/FVC  75    %  TLC  3.99 L 84 %pred  DLCOadj   73  % pred     V/Q Scan  /    /  n/a    Review of Systems   Constitution: Positive for weight gain. Negative for chills, fever, malaise/fatigue and weight loss.   HENT: Negative.    Eyes: Negative.    Cardiovascular: Positive for leg swelling. Negative for chest pain, dyspnea on exertion, near-syncope, orthopnea, palpitations, paroxysmal nocturnal dyspnea and syncope.   Respiratory: Negative for cough and shortness of breath.    Endocrine: Negative.    Skin: Negative.    Musculoskeletal: Negative.    Gastrointestinal: Negative for bloating, abdominal pain and change in bowel habit.   Genitourinary: Positive for frequency, hesitancy and urgency.   Neurological: Negative for dizziness and light-headedness.   Psychiatric/Behavioral: Negative for depression. The patient is nervous/anxious.         Objective:  /74   Pulse 86   Wt 72.6 kg (160 lb)   BMI 28.34 kg/m²       limited PE by video  No JVD  No LE edema  Eyes are indeed a little puffy though        Chemistry        Component Value Date/Time     12/23/2019 1136    K 4.2 12/23/2019 1136     12/23/2019 1136    CO2 23 12/23/2019 1136    BUN 20 12/23/2019 1136    CREATININE 0.8 12/23/2019 1136    GLU 90 12/23/2019 1136        Component Value Date/Time    CALCIUM 9.8 12/23/2019 1136    ALKPHOS 104 12/23/2019 1136    AST 31 12/23/2019 1136    ALT 31 12/23/2019 1136    BILITOT 0.8 12/23/2019 1136    ESTGFRAFRICA >60.0 12/23/2019 1136    EGFRNONAA >60.0 12/23/2019 1136            Magnesium   Date Value Ref Range Status   12/17/2019 2.3 1.6 - 2.6 mg/dL Final       Lab Results   Component Value Date    WBC 8.59 12/23/2019    HGB 17.4 (H) 12/23/2019    HCT 52.6 (H) 12/23/2019    MCV 93 12/23/2019      12/23/2019       Lab Results   Component Value Date    INR 1.0 12/23/2019       BNP   Date Value Ref Range Status   12/17/2019 43 0 - 99 pg/mL Final     Comment:     Values of less than 100 pg/ml are consistent with non-CHF populations.       No results found for: LDH          Assessment:       1. Pulmonary hypertension    2. Raynaud's disease without gangrene    3. Tobacco use    4. Methamphetamine use    5. Mixed hyperlipidemia    6. Essential hypertension    7. History of medication noncompliance    8. Chronic pulmonary heart disease      WHO Group:1  Functional Class 3->2     Plan:     severe PH, Most likely cause is methamphetamine use, and now clean x 107 days- doing very well with adempas and in NA    Stop lisinopril as pt not diabetic and pt normotensive- restart nifedipine as I think her raynauds is acting up     Ok to increase adempas to 2mg if bp about where it is today on Monday   ok to take macrobid, vistaril and anti-spasmodic for her UTI as rx'd by ED    Cont NA to  Help her remain free of meth- asked her to do online meetings until outbreak has ended.     Cont smoking cessation efforts as well    Keep salt intake to under 2000 mg sodium, fluids to under 2 L (64 oz)      F/u 2 mo with walk and labs if able, if not will do another virtual visit

## 2020-04-02 ENCOUNTER — TELEPHONE (OUTPATIENT)
Dept: TRANSPLANT | Facility: CLINIC | Age: 50
End: 2020-04-02

## 2020-04-02 NOTE — TELEPHONE ENCOUNTER
Returned call to Lizette, nurse with [facility] who did Adempas titration visit. Pt tolerating medication well, with no complaints. She did report going to ER 3/27 for cellulitis of left leg. Taking oral antibiotics. Pt's only complaints are related to bilateral leg swelling, and weight is 162 (was 157 at last titration visit). Pt notices increase in edema since restarting nifedipine.  No increased SOB. /79. Will increase to 2.5 mg TID. Message sent to Dr Herrera regarding all.

## 2020-04-03 ENCOUNTER — PATIENT MESSAGE (OUTPATIENT)
Dept: TRANSPLANT | Facility: CLINIC | Age: 50
End: 2020-04-03

## 2020-04-03 ENCOUNTER — TELEPHONE (OUTPATIENT)
Dept: TRANSPLANT | Facility: CLINIC | Age: 50
End: 2020-04-03

## 2020-04-03 NOTE — TELEPHONE ENCOUNTER
----- Message from Palma Herrera MD sent at 4/3/2020  9:30 AM CDT -----  Yes, its the nifed, which is common and do to blood vessels getting a little leaky but not so much fluid retention- monitor for now    ----- Message -----  From: JENNIFER Gunter, RN  Sent: 4/3/2020   9:27 AM CDT  To: Palma Herrera MD    Ok-For whatever reason, she's having swelling in her legs (which happened to coincide w/restarting nifedipine, which she continues to take per last clinic visit). Do you want to just monitor the leg swelling and weight gain?  ----- Message -----  From: Palma Herrera MD  Sent: 4/3/2020   9:24 AM CDT  To: JENNIFER Gunter, RN    If its the nifedipine she shouldn't need extra lasix, and K was fine on labs  ----- Message -----  From: JENNIFER Gunter, RN  Sent: 4/2/2020  12:14 PM CDT  To: Palma Herrera MD    Moving up to 2.5 Adempas. Had nurse visit, and pt apparently went to local ER for cellulitis in leg. Doing fine, but since restarting nifedipine, has swelling to bilateral feet/legs. Weight this visit was 162, up from 157. No complaints about SOB or SEs. Want to just watch, or should we have her bump lasix at all? Not taking any K, either.    Chasidy

## 2020-05-07 ENCOUNTER — TELEPHONE (OUTPATIENT)
Dept: TRANSPLANT | Facility: CLINIC | Age: 50
End: 2020-05-07

## 2020-05-07 ENCOUNTER — PATIENT MESSAGE (OUTPATIENT)
Dept: TRANSPLANT | Facility: CLINIC | Age: 50
End: 2020-05-07

## 2020-05-07 NOTE — TELEPHONE ENCOUNTER
MALINDA Bauer making adempas televisit w/pt today, reports that pt might have Left leg cellulitis. Pt has marked swelling of left leg, with warmth and redness. She is f/u w/PCP ASAP regarding same, as she has had previous ER visit for similar episode in recent past. Visit w/pt was otherwise unremarkable, and it is reported she is tolerating Adempas 2.5mg without difficulty.    Left VM w/pt's PCP, providing fax number, as they had previously called, requesting our fax number to send our office records.

## 2020-05-19 ENCOUNTER — PATIENT MESSAGE (OUTPATIENT)
Dept: TRANSPLANT | Facility: CLINIC | Age: 50
End: 2020-05-19

## 2020-05-19 RX ORDER — POTASSIUM CHLORIDE 750 MG/1
20 CAPSULE, EXTENDED RELEASE ORAL DAILY
COMMUNITY
End: 2020-07-29 | Stop reason: SDUPTHER

## 2020-07-22 ENCOUNTER — HOSPITAL ENCOUNTER (OUTPATIENT)
Dept: PULMONOLOGY | Facility: CLINIC | Age: 50
Discharge: HOME | End: 2020-07-22
Payer: MEDICAID

## 2020-07-22 ENCOUNTER — OFFICE VISIT (OUTPATIENT)
Dept: TRANSPLANT | Facility: CLINIC | Age: 50
End: 2020-07-22
Payer: MEDICAID

## 2020-07-22 VITALS — WEIGHT: 165 LBS | HEIGHT: 63 IN | BODY MASS INDEX: 29.23 KG/M2

## 2020-07-22 VITALS
DIASTOLIC BLOOD PRESSURE: 62 MMHG | OXYGEN SATURATION: 94 % | WEIGHT: 175.25 LBS | HEIGHT: 63 IN | SYSTOLIC BLOOD PRESSURE: 109 MMHG | BODY MASS INDEX: 31.05 KG/M2 | HEART RATE: 87 BPM

## 2020-07-22 DIAGNOSIS — I27.9 CHRONIC PULMONARY HEART DISEASE: ICD-10-CM

## 2020-07-22 DIAGNOSIS — F12.10 MARIJUANA ABUSE: ICD-10-CM

## 2020-07-22 DIAGNOSIS — I27.20 PULMONARY HYPERTENSION: Primary | ICD-10-CM

## 2020-07-22 DIAGNOSIS — F15.10 METHAMPHETAMINE USE: ICD-10-CM

## 2020-07-22 DIAGNOSIS — Z72.0 TOBACCO USE: ICD-10-CM

## 2020-07-22 DIAGNOSIS — M79.89 LEFT LEG SWELLING: ICD-10-CM

## 2020-07-22 DIAGNOSIS — E78.2 MIXED HYPERLIPIDEMIA: ICD-10-CM

## 2020-07-22 DIAGNOSIS — I73.00 RAYNAUD'S DISEASE WITHOUT GANGRENE: ICD-10-CM

## 2020-07-22 DIAGNOSIS — Z91.148 HISTORY OF MEDICATION NONCOMPLIANCE: ICD-10-CM

## 2020-07-22 DIAGNOSIS — I10 ESSENTIAL HYPERTENSION: ICD-10-CM

## 2020-07-22 PROCEDURE — 94618 PULMONARY STRESS TESTING: ICD-10-PCS | Mod: 26,S$PBB,, | Performed by: INTERNAL MEDICINE

## 2020-07-22 PROCEDURE — 94618 PULMONARY STRESS TESTING: CPT | Mod: 26,S$PBB,, | Performed by: INTERNAL MEDICINE

## 2020-07-22 PROCEDURE — 99214 PR OFFICE/OUTPT VISIT, EST, LEVL IV, 30-39 MIN: ICD-10-PCS | Mod: S$PBB,,, | Performed by: INTERNAL MEDICINE

## 2020-07-22 PROCEDURE — 99214 OFFICE O/P EST MOD 30 MIN: CPT | Mod: S$PBB,,, | Performed by: INTERNAL MEDICINE

## 2020-07-22 PROCEDURE — 94618 PULMONARY STRESS TESTING: CPT | Mod: PBBFAC | Performed by: INTERNAL MEDICINE

## 2020-07-22 PROCEDURE — 99214 OFFICE O/P EST MOD 30 MIN: CPT | Mod: PBBFAC | Performed by: INTERNAL MEDICINE

## 2020-07-22 PROCEDURE — 99999 PR PBB SHADOW E&M-EST. PATIENT-LVL IV: CPT | Mod: PBBFAC,,, | Performed by: INTERNAL MEDICINE

## 2020-07-22 PROCEDURE — 99999 PR PBB SHADOW E&M-EST. PATIENT-LVL IV: ICD-10-PCS | Mod: PBBFAC,,, | Performed by: INTERNAL MEDICINE

## 2020-07-22 NOTE — PROGRESS NOTES
Subjective:    Patient ID:  Selma Hartmann is a 49 y.o. female who presents for follow-up of Pulmonary Hypertension.          HPI   50 yo with methamphetamine use (urine tox + OSH 12/3/19), MJ use, tob use HTN, HLP, chart history of med noncompliance, h/o prescription opiate addiction, anxiety, h/o DVT after tubal ligation 12 yr ago,   Referred by Dr Alexander   For eval of PH  Had stroke in 2007- was to be started on bp med but didn't take it. Was brainstem, had to learn to walk/talk again- was clean for 3 yr at this time, as well as when she was pregnant with her 12 year old son and the first 3 yr of his life.    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 No CP. One time, felt like a wave came over her body- was walking down the ma, everything went dark, held on to the walls, laid down, after a couple seconds it went away, has not happened again.     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 Had been using for the last 9 years and this is the longest she has been clean- says she has no desire to use, thinks she was self medicating- has kicked people out of her house who offered it to her.  Has held a cigarette 3 times since her hosp d/c- puffed but didn't  inhale)  Relays on medicaid transport    + raynauds- says she tested + for lupus at the time of her stroke but then was told she didn't have it    Watching her Na    FH: F HTN  M HTN  S: crohns, HTN    Six Minute Walk Test:   381  m (  335  m in dec '19   )                                              O2 sat  94 ->96  %                                                           HR 95  -> 124                                                                 BP  109 / 66  ->115 /67                                                         Layla   0.5  -> 5-6    RHC 12/23/19  · Severe Pulmonary arterial hypertension (treatment naive)  · Normal right and left-sided filling pressures.  · Normal cardiac output and index by Mil method.  · No response to inhaled nitric oxide.  · RA: 8/ 7/ 5 RV: 75/ -5/ 4 PA: 83/ 35/ 51 PWP: 12/ 0/ 10 . Cardiac output was 4.35 by Mil. Cardiac index is 2.44 L/min/m2. O2 Sat: PA 69%. Pulmonary vascular resistance: 9.4. /85 AO sat (92%) Bryanna 20 ppm PA 78/32 (47) 40 ppm PA 72/30 (45) 80 ppm PA 72/30 (45)         Echo      12/2/19  Normal LV size, LV thickness mild-mod increased, EF 55-60%  E/A reversal  RV severely dilated, D shaped septum  Normal LA  Mild ISSA  RVSP 110  No effusion  RAP 15    EKG  12 / 3   /19  NSR( I viewed tracing)  bettie  rward axis irbbb        CXR   /    /  (-) OSH    CT Chest  12  /  3  /   19  (-) PE, main PA 3.5 cm, normal lung parenchyma    PFTs    12 / 4 /19    FVC    3.3  L  98    % pred  FEV1   2.47 L   92  % pred  FEV1/FVC  75    %  TLC  3.99 L 84 %pred  DLCOadj   73  % pred     V/Q Scan  /    /  n/a    Review of Systems   Constitution: Positive for weight gain. Negative for chills, fever, malaise/fatigue and weight loss.   HENT: Negative.    Eyes: Negative.    Cardiovascular: Positive for leg swelling. Negative for chest pain, dyspnea on exertion, near-syncope, orthopnea, palpitations, paroxysmal nocturnal dyspnea and syncope.   Respiratory: Negative for cough  "and shortness of breath.    Endocrine: Negative.    Skin: Negative.    Musculoskeletal: Negative.    Gastrointestinal: Negative for bloating, abdominal pain and change in bowel habit.   Genitourinary: Positive for frequency, hesitancy and urgency.   Neurological: Negative for dizziness and light-headedness.   Psychiatric/Behavioral: Negative for depression. The patient is nervous/anxious.         Objective:  /62 (BP Location: Right arm, Patient Position: Sitting, BP Method: Medium (Automatic))   Pulse 87   Ht 5' 3" (1.6 m)   Wt 79.5 kg (175 lb 4.3 oz)   SpO2 (!) 94%   BMI 31.05 kg/m²        Physical Exam   Constitutional: She is oriented to person, place, and time. She appears well-developed and well-nourished.   HENT:   Head: Normocephalic and atraumatic.   Eyes: Right eye exhibits no discharge. Left eye exhibits no discharge.   Neck: Neck supple. No JVD present. No thyromegaly present.   Cardiovascular: Normal rate and regular rhythm. Exam reveals no gallop and no friction rub.   No murmur heard.       Pulmonary/Chest: Effort normal and breath sounds normal. No respiratory distress. She has no wheezes. She has no rales.   Abdominal: Soft. Bowel sounds are normal. She exhibits no distension. There is no abdominal tenderness.   Musculoskeletal: Normal range of motion.         General: Edema present. No tenderness.   Neurological: She is alert and oriented to person, place, and time. No cranial nerve deficit. Coordination normal.   Skin: Skin is warm and dry. No rash noted.   Psychiatric: She has a normal mood and affect. Judgment and thought content normal.           Chemistry        Component Value Date/Time     07/22/2020 1403    K 3.3 (L) 07/22/2020 1403     07/22/2020 1403    CO2 25 07/22/2020 1403    BUN 16 07/22/2020 1403    CREATININE 0.8 07/22/2020 1403     (H) 07/22/2020 1403        Component Value Date/Time    CALCIUM 9.1 07/22/2020 1403    ALKPHOS 79 07/22/2020 1403    AST 20 " 07/22/2020 1403    ALT 20 07/22/2020 1403    BILITOT 0.8 07/22/2020 1403    ESTGFRAFRICA >60.0 07/22/2020 1403    EGFRNONAA >60.0 07/22/2020 1403            Magnesium   Date Value Ref Range Status   07/22/2020 2.1 1.6 - 2.6 mg/dL Final       Lab Results   Component Value Date    WBC 7.40 07/22/2020    HGB 14.5 07/22/2020    HCT 41.7 07/22/2020    MCV 91 07/22/2020     07/22/2020       Lab Results   Component Value Date    INR 1.0 12/23/2019       BNP   Date Value Ref Range Status   12/17/2019 43 0 - 99 pg/mL Final     Comment:     Values of less than 100 pg/ml are consistent with non-CHF populations.       No results found for: LDH          Assessment:       1. Pulmonary hypertension    2. Methamphetamine use    3. Marijuana abuse    4. Essential hypertension    5. History of medication noncompliance    6. Tobacco use    7. Chronic pulmonary heart disease    8. Mixed hyperlipidemia    9. Raynaud's disease without gangrene    10. Left leg swelling      WHO Group:1  Functional Class 3->2     Plan:     severe PH, Most likely cause is methamphetamine use, and now clean x 8 mo - doing very well with adempas and in NA    Will get CT venogram pelvis as she has asx swelling of the whole left leg and has had DVT before- would like to r/o May Thurner syndrome    Has been taking aleve once a day for aches- asked her to avoid NSAIDs- tylenol ok    Needs to stop drinking sweet tea and dr tolbert to help w wt loss    Want her to talk to her sleep doc to find a mask/machine that is comfortable for her so she can wear CPAP consistently.     Cont NA to  Help her remain free of meth- asked her to do online meetings until outbreak has ended.     Cont smoking cessation efforts as well    Keep salt intake to under 2000 mg sodium, fluids to under 2 L (64 oz)      F/u 3mo with echo walk and labs and CT as above- based on appearance of RV on echo can determine timing of repeat RHC (will be year in Dec)

## 2020-07-22 NOTE — PATIENT INSTRUCTIONS
Stop taking aleve, take tylenol as needed for pain    Keep salt intake to under 2000 mg sodium, fluids to under 2 L (64 oz)    Watch the Dr pepper and sweet tea to help with weight loss    Keep working on your sobriety, i'm so proud of you    Call your sleep doc to see about finding a mask or machine that feels comfortable for  You so you can wear it every night    CT scan next time to see if there is compression of the vein carrying the blood back to your heart from your leg    Ask your primary care doc to check your vitamin D levels to see if you need to take a supplement

## 2020-07-22 NOTE — PROCEDURES
Selma Hartmann is a 49 y.o.  female patient, who presents for a 6 minute walk test ordered by MD Sharon.  The diagnosis is Pulmonary Hypertension.  The patient's BMI is 29.2 kg/m2.  Predicted distance (lower limit of normal) is 410.12 meters.      Test Results:    The test was completed without stopping.  The total time walked was 360 seconds.  During walking, the patient reported:  Dyspnea.  The patient used no assistive devices during testing.     07/22/2020---------Distance: 381 meters (1250 feet)     O2 Sat % Supplemental Oxygen Heart Rate Blood Pressure Layla Scale   Pre-exercise  (Resting) 94 % Room Air 95 bpm 109/66 mmHg 0.5   During Exercise 96 % Room Air 124 bpm 115/67 mmHg 5-6   Post-exercise  (Recovery) 95 % Room Air  101 bpm       Recovery Time: 95 seconds    Performing nurse/tech: Mikhail DON      PREVIOUS STUDY:   12/17/2019---------Distance: 335.28 meters (1100 feet)       O2 Sat % Supplemental Oxygen Heart Rate Blood Pressure Layla Scale   Pre-exercise  (Resting) 92 % Room Air 92 bpm 117/74 mmHg 0.5   During Exercise   90 % Room Air 98 bpm 120/80 mmHg 4   Post-exercise  (Recovery) 92 % Room Air  91 bpm   mmHg         CLINICAL INTERPRETATION:  Six minute walk distance is 381 meters (1250 feet) with heavy dyspnea.  During exercise, there was no desaturation while breathing room air.  Blood pressure remained stable and Heart rate increased significantly with walking.  The patient did not report non-pulmonary symptoms during exercise.  Since the previous study in December 2019, exercise capacity may be somewhat improved.  Based upon age and body mass index, exercise capacity is less than predicted.

## 2020-07-28 ENCOUNTER — TELEPHONE (OUTPATIENT)
Dept: TRANSPLANT | Facility: CLINIC | Age: 50
End: 2020-07-28

## 2020-07-28 NOTE — TELEPHONE ENCOUNTER
----- Message from Palma Herrera MD sent at 7/28/2020 10:26 AM CDT -----  Please ask her to take two a day  ----- Message -----  From: JENNIFER Gunter, RN  Sent: 7/27/2020   7:41 AM CDT  To: Palma Herrera MD    Did she need to adjust potassium? Didn't see in notes.

## 2020-07-30 RX ORDER — POTASSIUM CHLORIDE 750 MG/1
20 CAPSULE, EXTENDED RELEASE ORAL DAILY
Qty: 60 CAPSULE | Refills: 11 | Status: SHIPPED | OUTPATIENT
Start: 2020-07-30 | End: 2021-07-29 | Stop reason: SDUPTHER

## 2020-09-14 ENCOUNTER — TELEPHONE (OUTPATIENT)
Dept: TRANSPLANT | Facility: CLINIC | Age: 50
End: 2020-09-14

## 2020-09-14 NOTE — TELEPHONE ENCOUNTER
Spoke w/pt. She was not at home to confirm her omeprazole dose. Will let us know when she confirms.. Explained that both Rxs (ambien and omeprazole) would be forwarded to Dr Jorge, but uncertain if Dr Jorge would fill ambien, or not. Pt verbalized understanding/agreement.

## 2020-11-03 DIAGNOSIS — I27.9 CHRONIC PULMONARY HEART DISEASE: ICD-10-CM

## 2020-11-03 DIAGNOSIS — R06.82 TACHYPNEA: ICD-10-CM

## 2020-11-03 DIAGNOSIS — Z79.899 POLYPHARMACY: Primary | ICD-10-CM

## 2020-11-11 DIAGNOSIS — N39.0 UTI (URINARY TRACT INFECTION): Primary | ICD-10-CM

## 2020-12-07 ENCOUNTER — OFFICE VISIT (OUTPATIENT)
Dept: UROLOGY | Facility: CLINIC | Age: 50
End: 2020-12-07
Payer: MEDICAID

## 2020-12-07 VITALS
DIASTOLIC BLOOD PRESSURE: 66 MMHG | SYSTOLIC BLOOD PRESSURE: 102 MMHG | RESPIRATION RATE: 18 BRPM | BODY MASS INDEX: 30.12 KG/M2 | HEIGHT: 63 IN | WEIGHT: 170 LBS | HEART RATE: 101 BPM

## 2020-12-07 DIAGNOSIS — N39.0 RECURRENT UTI (URINARY TRACT INFECTION): Primary | ICD-10-CM

## 2020-12-07 PROCEDURE — 99204 OFFICE O/P NEW MOD 45 MIN: CPT | Mod: 25,S$GLB,, | Performed by: SPECIALIST

## 2020-12-07 PROCEDURE — 99204 PR OFFICE/OUTPT VISIT, NEW, LEVL IV, 45-59 MIN: ICD-10-PCS | Mod: 25,S$GLB,, | Performed by: SPECIALIST

## 2020-12-07 PROCEDURE — 81001 URINALYSIS AUTO W/SCOPE: CPT | Mod: S$GLB,,, | Performed by: SPECIALIST

## 2020-12-07 PROCEDURE — 81001 PR  URINALYSIS, AUTO, W/SCOPE: ICD-10-PCS | Mod: S$GLB,,, | Performed by: SPECIALIST

## 2020-12-07 RX ORDER — OMEPRAZOLE 20 MG/1
20 TABLET, DELAYED RELEASE ORAL DAILY
COMMUNITY
End: 2023-09-21

## 2020-12-07 NOTE — LETTER
December 7, 2020      Donna Guillory, STANLEY  [address] [facility] [address] [facility] [address]           [facility]  [address] [address]  Phone: [phone]  Fax: [phone]          Patient: Selma Hartmann   MR Number: [id number]   YOB: 1970   Date of Visit: 12/7/2020       Dear Donna Guillory:    Thank you for referring Selma Hartmann to me for evaluation. Attached you will find relevant portions of my assessment and plan of care.    If you have questions, please do not hesitate to call me. I look forward to following Selma Hartmann along with you.    Sincerely,    Dewayne Thorpe MD    Enclosure  CC:  No Recipients    If you would like to receive this communication electronically, please contact [email or web address] or [phone] to request more information on [employer] access.    For providers and/or their staff who would like to refer a patient to [facility], please contact us through our one-stop-shop provider referral line, [facility], at [phone].    If you feel you have received this communication in error or would no longer like to receive these types of communications, please e-mail [email or web address]

## 2020-12-08 NOTE — PROGRESS NOTES
Subjective:       Patient ID: Selma Hartmann is a 49 y.o. female.    Chief Complaint: UTI      HPI:  49-year-old female referred to us for management of recurrent urinary tract infections.  This patient reports that she has been dealing with these infections for quite sometime in the progressively worsening.  In normally present to her with lower back pain.  She has stent seen either at her primary care physician's office or an urgent care where a urine sample was checked and a UTIs reported.  She is treated with antibiotics.  Several weeks later she goes through the same cycle.  When question today if she has any symptoms of urinary tract infection she is reporting that she has some mild back pain but is not as severe as what she normally experiences prior to going to seek care.    She has not had a hysterectomy she denies any subjective signs and symptoms of prolapse.  She denies any bothersome lower urinary tract symptoms such as urgency, urge incontinence, frequency or nocturia.  She states that she never has dysuria or suprapubic discomfort whenever she has a UTI.  She is not sexually active and does not use any bed area contraceptives.    Past Medical History:   Past Medical History:   Diagnosis Date    Cardiomyopathy     Hyperlipidemia     Hypertension     Stroke        Past Surgical Historical:   Past Surgical History:   Procedure Laterality Date    RIGHT HEART CATHETERIZATION Right 12/23/2019    Procedure: HEART CATH-RIGHT;  Surgeon: Palma Herrera MD;  Location: [facility] CATH LAB;  Service: Cardiology;  Laterality: Right;    TUBAL LIGATION          Medications:   Medication List with Changes/Refills   Current Medications    ASPIRIN (ECOTRIN) 81 MG EC TABLET    Take 81 mg by mouth once daily. TAKE 3 TABLETS IN THE MORNING    ERGOCALCIFEROL, VITAMIN D2, (VITAMIN D ORAL)    Take by mouth. OTC    FUROSEMIDE (LASIX) 40 MG TABLET    Take 40 mg by mouth Daily.    NIFEDIPINE (PROCARDIA-XL) 60 MG (OSM)  24 HR TABLET    Take 1 tablet (60 mg total) by mouth once daily.    OMEPRAZOLE (PRILOSEC OTC) 20 MG TABLET    Take 20 mg by mouth once daily.    POTASSIUM CHLORIDE (MICRO-K) 10 MEQ CPSR    Take 2 capsules (20 mEq total) by mouth once daily.    RIOCIGUAT (ADEMPAS) 2.5 MG TABLET    Take 2.5 mg by mouth 3 (three) times daily.    ZOLPIDEM (AMBIEN) 5 MG TAB    Take 5 mg by mouth every evening.        Past Social History:   Social History     Socioeconomic History    Marital status:      Spouse name: Not on file    Number of children: Not on file    Years of education: Not on file    Highest education level: Not on file   Occupational History    Not on file   Social Needs    Financial resource strain: Not on file    Food insecurity     Worry: Not on file     Inability: Not on file    Transportation needs     Medical: Not on file     Non-medical: Not on file   Tobacco Use    Smoking status: Former Smoker    Smokeless tobacco: Never Used   Substance and Sexual Activity    Alcohol use: Not Currently     Frequency: Never    Drug use: Never    Sexual activity: Not Currently   Lifestyle    Physical activity     Days per week: Not on file     Minutes per session: Not on file    Stress: Not on file   Relationships    Social connections     Talks on phone: Not on file     Gets together: Not on file     Attends [employer] service: Not on file     Active member of club or organization: Not on file     Attends meetings of clubs or organizations: Not on file     Relationship status: Not on file   Other Topics Concern    Not on file   Social History Narrative    Not on file       Allergies:   Review of patient's allergies indicates:   Allergen Reactions    Plavix [clopidogrel]         Family History:   Family History   Problem Relation Age of Onset    Hypertension Father     Hypertension Mother         Review of Systems:  Review of Systems - General ROS: negative  Psychological ROS: negative  Ophthalmic ROS:  negative  ENT ROS: negative  Allergy and Immunology ROS: negative  Hematological and Lymphatic ROS: negative  Endocrine ROS: negative  Respiratory ROS: no cough, shortness of breath, or wheezing  Cardiovascular ROS: no chest pain or dyspnea on exertion  Gastrointestinal ROS: no abdominal pain, change in bowel habits, or black or bloody stools  Genito-Urinary ROS: positive for - recurrent urinary tract infection, back pain.  Musculoskeletal ROS: negative  Neurological ROS: no TIA or stroke symptoms  Dermatological ROS: negative     Physical Exam:  General Appearance:    Alert, cooperative, no distress, appears stated age   Head:    Normocephalic, without obvious abnormality, atraumatic   Eyes:    PERRL, conjunctiva/corneas clear, EOM's intact, fundi     benign, both eyes   Ears:    Normal TM's and external ear canals, both ears   Nose:   Nares normal, septum midline, mucosa normal, no drainage    or sinus tenderness   Throat:   Lips, mucosa, and tongue normal; teeth and gums normal   Neck:   Supple, symmetrical, trachea midline, no adenopathy;     thyroid:  no enlargement/tenderness/nodules; no carotid    bruit or JVD   Back:     Symmetric, no curvature, ROM normal, no CVA tenderness   Lungs:     Clear to auscultation bilaterally, respirations unlabored   Chest Wall:    No tenderness or deformity    Heart:    Regular rate and rhythm, S1 and S2 normal, no murmur, rub   or gallop   Breast Exam:    No tenderness, masses, or nipple abnormality   Abdomen:     Soft, non-tender, bowel sounds active all four quadrants,     no masses, no organomegaly   Genitalia:    Deferred   Rectal:    Referred   Extremities:   Extremities normal, atraumatic, no cyanosis or edema   Pulses:   2+ and symmetric all extremities   Skin:   Skin color, texture, turgor normal, no rashes or lesions   Lymph nodes:   Cervical, supraclavicular, and axillary nodes normal   Neurologic:   CNII-XII intact, normal strength, sensation and reflexes      throughout     Urinalysis:  Completely negative.    Assessment/Plan:       49-year-old female with recurrent urinary tract infections presenting for evaluation    1.  Patient is not symptomatic today and urinalysis today is negative.  I would recommend observation for now  2.  I have recommended for patient to bring me her records from the PCP's office as well as from the urgent care for our review  3.  Return to clinic in 3 months or sooner if the symptoms of UTI present    Problem List Items Addressed This Visit     None      Visit Diagnoses     Recurrent UTI (urinary tract infection)    -  Primary    Relevant Orders    POCT Urinalysis (w/Micro Option)

## 2021-02-26 ENCOUNTER — HOSPITAL ENCOUNTER (OUTPATIENT)
Dept: PULMONOLOGY | Facility: CLINIC | Age: 51
Discharge: HOME | End: 2021-02-26
Payer: MEDICAID

## 2021-02-26 ENCOUNTER — OFFICE VISIT (OUTPATIENT)
Dept: TRANSPLANT | Facility: CLINIC | Age: 51
End: 2021-02-26
Payer: MEDICAID

## 2021-02-26 ENCOUNTER — HOSPITAL ENCOUNTER (OUTPATIENT)
Dept: CARDIOLOGY | Facility: HOSPITAL | Age: 51
Discharge: HOME | End: 2021-02-26
Attending: INTERNAL MEDICINE
Payer: MEDICAID

## 2021-02-26 VITALS — HEIGHT: 63 IN | BODY MASS INDEX: 29.59 KG/M2 | WEIGHT: 167 LBS

## 2021-02-26 VITALS
OXYGEN SATURATION: 95 % | SYSTOLIC BLOOD PRESSURE: 110 MMHG | DIASTOLIC BLOOD PRESSURE: 63 MMHG | BODY MASS INDEX: 31.48 KG/M2 | HEART RATE: 89 BPM | HEIGHT: 63 IN | WEIGHT: 177.69 LBS

## 2021-02-26 VITALS
HEIGHT: 63 IN | BODY MASS INDEX: 30.12 KG/M2 | HEART RATE: 90 BPM | WEIGHT: 170 LBS | DIASTOLIC BLOOD PRESSURE: 72 MMHG | SYSTOLIC BLOOD PRESSURE: 110 MMHG

## 2021-02-26 DIAGNOSIS — I27.9 CHRONIC PULMONARY HEART DISEASE: ICD-10-CM

## 2021-02-26 DIAGNOSIS — I27.20 PULMONARY HYPERTENSION: Primary | ICD-10-CM

## 2021-02-26 DIAGNOSIS — I73.00 RAYNAUD'S DISEASE WITHOUT GANGRENE: ICD-10-CM

## 2021-02-26 DIAGNOSIS — I10 ESSENTIAL HYPERTENSION: ICD-10-CM

## 2021-02-26 LAB
ASCENDING AORTA: 3.02 CM
AV INDEX (PROSTH): 0.79
AV MEAN GRADIENT: 5 MMHG
AV PEAK GRADIENT: 9 MMHG
AV VALVE AREA: 3.04 CM2
AV VELOCITY RATIO: 0.81
BSA FOR ECHO PROCEDURE: 1.85 M2
CV ECHO LV RWT: 0.33 CM
DOP CALC AO PEAK VEL: 1.49 M/S
DOP CALC AO VTI: 30.11 CM
DOP CALC LVOT AREA: 3.8 CM2
DOP CALC LVOT DIAMETER: 2.21 CM
DOP CALC LVOT PEAK VEL: 1.21 M/S
DOP CALC LVOT STROKE VOLUME: 91.67 CM3
DOP CALCLVOT PEAK VEL VTI: 23.91 CM
E WAVE DECELERATION TIME: 232.77 MSEC
E/A RATIO: 1.04
E/E' RATIO: 10.63 M/S
ECHO LV POSTERIOR WALL: 0.82 CM (ref 0.6–1.1)
FRACTIONAL SHORTENING: 30 % (ref 28–44)
INTERVENTRICULAR SEPTUM: 0.83 CM (ref 0.6–1.1)
LA MAJOR: 5.7 CM
LA MINOR: 5.76 CM
LA WIDTH: 3.2 CM
LEFT ATRIUM SIZE: 3.7 CM
LEFT ATRIUM VOLUME INDEX MOD: 21.2 ML/M2
LEFT ATRIUM VOLUME INDEX: 32 ML/M2
LEFT ATRIUM VOLUME MOD: 38.2 CM3
LEFT ATRIUM VOLUME: 57.67 CM3
LEFT INTERNAL DIMENSION IN SYSTOLE: 3.42 CM (ref 2.1–4)
LEFT VENTRICLE DIASTOLIC VOLUME INDEX: 63.06 ML/M2
LEFT VENTRICLE DIASTOLIC VOLUME: 113.5 ML
LEFT VENTRICLE MASS INDEX: 76 G/M2
LEFT VENTRICLE SYSTOLIC VOLUME INDEX: 26.7 ML/M2
LEFT VENTRICLE SYSTOLIC VOLUME: 48.04 ML
LEFT VENTRICULAR INTERNAL DIMENSION IN DIASTOLE: 4.91 CM (ref 3.5–6)
LEFT VENTRICULAR MASS: 136.99 G
LV LATERAL E/E' RATIO: 8.5 M/S
LV SEPTAL E/E' RATIO: 14.17 M/S
MV A" WAVE DURATION": 7.99 MSEC
MV PEAK A VEL: 0.82 M/S
MV PEAK E VEL: 0.85 M/S
MV STENOSIS PRESSURE HALF TIME: 67.5 MS
MV VALVE AREA P 1/2 METHOD: 3.26 CM2
PISA TR MAX VEL: 2.7 M/S
PULM VEIN S/D RATIO: 1.23
PV PEAK D VEL: 0.52 M/S
PV PEAK S VEL: 0.64 M/S
RA MAJOR: 4.55 CM
RA PRESSURE: 3 MMHG
RA WIDTH: 3.71 CM
RIGHT VENTRICULAR END-DIASTOLIC DIMENSION: 3.04 CM
SINUS: 3.24 CM
STJ: 3.01 CM
TDI LATERAL: 0.1 M/S
TDI SEPTAL: 0.06 M/S
TDI: 0.08 M/S
TR MAX PG: 29 MMHG
TRICUSPID ANNULAR PLANE SYSTOLIC EXCURSION: 1.61 CM
TV REST PULMONARY ARTERY PRESSURE: 32 MMHG

## 2021-02-26 PROCEDURE — 93306 TTE W/DOPPLER COMPLETE: CPT

## 2021-02-26 PROCEDURE — 99999 PR PBB SHADOW E&M-EST. PATIENT-LVL III: ICD-10-PCS | Mod: PBBFAC,,, | Performed by: INTERNAL MEDICINE

## 2021-02-26 PROCEDURE — 93306 TTE W/DOPPLER COMPLETE: CPT | Mod: 26,,, | Performed by: INTERNAL MEDICINE

## 2021-02-26 PROCEDURE — 99214 PR OFFICE/OUTPT VISIT, EST, LEVL IV, 30-39 MIN: ICD-10-PCS | Mod: S$PBB,,, | Performed by: INTERNAL MEDICINE

## 2021-02-26 PROCEDURE — 99214 OFFICE O/P EST MOD 30 MIN: CPT | Mod: S$PBB,,, | Performed by: INTERNAL MEDICINE

## 2021-02-26 PROCEDURE — 94618 PULMONARY STRESS TESTING: CPT | Mod: 26,S$PBB,, | Performed by: INTERNAL MEDICINE

## 2021-02-26 PROCEDURE — 99213 OFFICE O/P EST LOW 20 MIN: CPT | Mod: PBBFAC,25 | Performed by: INTERNAL MEDICINE

## 2021-02-26 PROCEDURE — 94618 PULMONARY STRESS TESTING: CPT | Mod: PBBFAC | Performed by: INTERNAL MEDICINE

## 2021-02-26 PROCEDURE — 94618 PULMONARY STRESS TESTING: ICD-10-PCS | Mod: 26,S$PBB,, | Performed by: INTERNAL MEDICINE

## 2021-02-26 PROCEDURE — 99999 PR PBB SHADOW E&M-EST. PATIENT-LVL III: CPT | Mod: PBBFAC,,, | Performed by: INTERNAL MEDICINE

## 2021-02-26 PROCEDURE — 93306 ECHO (CUPID ONLY): ICD-10-PCS | Mod: 26,,, | Performed by: INTERNAL MEDICINE

## 2021-02-26 RX ORDER — ALPRAZOLAM 0.5 MG/1
0.5 TABLET ORAL DAILY PRN
COMMUNITY
Start: 2020-12-28

## 2021-03-01 ENCOUNTER — TELEPHONE (OUTPATIENT)
Dept: TRANSPLANT | Facility: HOSPITAL | Age: 51
End: 2021-03-01

## 2021-03-29 RX ORDER — NIFEDIPINE 60 MG/1
60 TABLET, EXTENDED RELEASE ORAL DAILY
Qty: 30 TABLET | Refills: 11 | Status: SHIPPED | OUTPATIENT
Start: 2021-03-29 | End: 2021-03-31 | Stop reason: SDUPTHER

## 2021-04-01 RX ORDER — NIFEDIPINE 60 MG/1
60 TABLET, EXTENDED RELEASE ORAL DAILY
Qty: 30 TABLET | Refills: 11 | Status: SHIPPED | OUTPATIENT
Start: 2021-04-01 | End: 2022-04-01

## 2021-04-13 ENCOUNTER — TELEPHONE (OUTPATIENT)
Dept: TRANSPLANT | Facility: CLINIC | Age: 51
End: 2021-04-13

## 2021-04-15 ENCOUNTER — DOCUMENTATION ONLY (OUTPATIENT)
Dept: TRANSPLANT | Facility: CLINIC | Age: 51
End: 2021-04-15

## 2021-04-16 ENCOUNTER — TELEPHONE (OUTPATIENT)
Dept: TRANSPLANT | Facility: CLINIC | Age: 51
End: 2021-04-16

## 2021-05-17 ENCOUNTER — EPISODE CHANGES (OUTPATIENT)
Dept: TRANSPLANT | Facility: CLINIC | Age: 51
End: 2021-05-17

## 2021-06-04 ENCOUNTER — TELEPHONE (OUTPATIENT)
Dept: TRANSPLANT | Facility: CLINIC | Age: 51
End: 2021-06-04

## 2021-06-04 DIAGNOSIS — Z79.01 ANTICOAGULATION ADEQUATE: ICD-10-CM

## 2021-06-04 DIAGNOSIS — I27.9 CHRONIC PULMONARY HEART DISEASE: ICD-10-CM

## 2021-06-04 DIAGNOSIS — Z79.899 POLYPHARMACY: Primary | ICD-10-CM

## 2021-06-04 DIAGNOSIS — R06.82 TACHYPNEA: ICD-10-CM

## 2021-07-29 RX ORDER — POTASSIUM CHLORIDE 750 MG/1
20 CAPSULE, EXTENDED RELEASE ORAL DAILY
Qty: 60 CAPSULE | Refills: 11 | Status: SHIPPED | OUTPATIENT
Start: 2021-07-29 | End: 2023-09-21

## 2021-10-15 ENCOUNTER — TELEPHONE (OUTPATIENT)
Dept: TRANSPLANT | Facility: CLINIC | Age: 51
End: 2021-10-15

## 2021-10-20 ENCOUNTER — PATIENT MESSAGE (OUTPATIENT)
Dept: TRANSPLANT | Facility: CLINIC | Age: 51
End: 2021-10-20

## 2021-10-22 DIAGNOSIS — Z13.9 SCREENING PROCEDURE: Primary | ICD-10-CM

## 2021-11-09 ENCOUNTER — HOSPITAL ENCOUNTER (OUTPATIENT)
Facility: HOSPITAL | Age: 51
Discharge: HOME | End: 2021-11-09
Attending: INTERNAL MEDICINE | Admitting: INTERNAL MEDICINE
Payer: MEDICAID

## 2021-11-09 VITALS
BODY MASS INDEX: 31.01 KG/M2 | WEIGHT: 175 LBS | TEMPERATURE: 98 F | HEIGHT: 63 IN | RESPIRATION RATE: 18 BRPM | SYSTOLIC BLOOD PRESSURE: 123 MMHG | HEART RATE: 98 BPM | OXYGEN SATURATION: 93 % | DIASTOLIC BLOOD PRESSURE: 77 MMHG

## 2021-11-09 DIAGNOSIS — I27.9 CHRONIC PULMONARY HEART DISEASE: Primary | ICD-10-CM

## 2021-11-09 DIAGNOSIS — I27.20 PULMONARY HTN: ICD-10-CM

## 2021-11-09 DIAGNOSIS — I27.20 PULMONARY HYPERTENSION: ICD-10-CM

## 2021-11-09 PROCEDURE — 93451 RIGHT HEART CATH: CPT | Performed by: INTERNAL MEDICINE

## 2021-11-09 PROCEDURE — 93451 PR RIGHT HEART CATH O2 SATURATION & CARDIAC OUTPUT: ICD-10-PCS | Mod: 26,,, | Performed by: INTERNAL MEDICINE

## 2021-11-09 PROCEDURE — 93451 RIGHT HEART CATH: CPT | Mod: 26,,, | Performed by: INTERNAL MEDICINE

## 2021-11-09 PROCEDURE — C1751 CATH, INF, PER/CENT/MIDLINE: HCPCS | Performed by: INTERNAL MEDICINE

## 2021-11-09 PROCEDURE — C1894 INTRO/SHEATH, NON-LASER: HCPCS | Performed by: INTERNAL MEDICINE

## 2021-11-09 PROCEDURE — 25000003 PHARM REV CODE 250: Performed by: INTERNAL MEDICINE

## 2021-11-09 RX ORDER — HYDROGEN PEROXIDE 3 %
20 SOLUTION, NON-ORAL MISCELLANEOUS
COMMUNITY
End: 2023-09-21

## 2021-11-09 RX ORDER — IBUPROFEN 200 MG
400 TABLET ORAL EVERY 6 HOURS PRN
COMMUNITY
End: 2023-04-25

## 2021-11-09 RX ORDER — LIDOCAINE HYDROCHLORIDE 20 MG/ML
INJECTION, SOLUTION INFILTRATION; PERINEURAL
Status: DISCONTINUED | OUTPATIENT
Start: 2021-11-09 | End: 2021-11-09 | Stop reason: HOSPADM

## 2021-11-09 RX ORDER — SODIUM CHLORIDE 0.9 G/100ML
IRRIGANT IRRIGATION
Status: DISCONTINUED | OUTPATIENT
Start: 2021-11-09 | End: 2021-11-09 | Stop reason: HOSPADM

## 2022-02-09 DIAGNOSIS — R06.02 SHORTNESS OF BREATH: ICD-10-CM

## 2022-02-09 DIAGNOSIS — I27.20 PULMONARY HYPERTENSION: Primary | ICD-10-CM

## 2022-02-10 ENCOUNTER — TELEPHONE (OUTPATIENT)
Dept: TRANSPLANT | Facility: CLINIC | Age: 52
End: 2022-02-10

## 2022-02-11 ENCOUNTER — PATIENT MESSAGE (OUTPATIENT)
Dept: TRANSPLANT | Facility: CLINIC | Age: 52
End: 2022-02-11

## 2022-02-17 ENCOUNTER — TELEPHONE (OUTPATIENT)
Dept: TRANSPLANT | Facility: CLINIC | Age: 52
End: 2022-02-17

## 2022-02-17 NOTE — TELEPHONE ENCOUNTER
Approved today  Approved for ADEMPAS (Riociguat Tablet 2.5 MG), quantity up to 90 tablets per 30 days, under the pharmacy benefit. Generic substitution required when available.

## 2022-06-21 ENCOUNTER — TELEPHONE (OUTPATIENT)
Dept: TRANSPLANT | Facility: CLINIC | Age: 52
End: 2022-06-21
Payer: MEDICARE

## 2022-06-21 NOTE — TELEPHONE ENCOUNTER
Approved today  Request Reference Number: [id number]. ADEMPAS TAB 2.5MG is approved through 12/31/2022. Your patient may now fill this prescription and it will be covered.

## 2022-09-08 ENCOUNTER — PATIENT MESSAGE (OUTPATIENT)
Dept: TRANSPLANT | Facility: CLINIC | Age: 52
End: 2022-09-08
Payer: MEDICARE

## 2022-09-08 ENCOUNTER — TELEPHONE (OUTPATIENT)
Dept: TRANSPLANT | Facility: CLINIC | Age: 52
End: 2022-09-08
Payer: MEDICARE

## 2022-09-08 NOTE — TELEPHONE ENCOUNTER
"Spoke with patient via phone. Do not have labs or ECHO to review for her appointment. Mrs. Hartmann reports that she has been mentally unwell. She is seeing a behavioral specialist who is prescribing medication and providing counseling. Mrs. Hartmann says she is profoundly depressed, very anxious. It's difficult for her to go out in crowds. She has been unable to get back to [facility] for in-person appts since she was changed to Medicare from Medicaid. Medicare does not provide transport. She is very worried that she will lose her disability because her "PH numbers" are better. She notes that she tried to go back to work once, but could not handle it - anxious, felt short of breath. She is taking her Adempas 2.5mg, TID. Does not have much SOB, but says she doesn't do much either. Has sleep apnea and wears CPAP, but not consistently. Saw her pulmonologist (Dr. Seth Alexander ([place]) last month. Denies CP, palpitations, LE swelling.    Will retrieve recent test results, review, and contact patient. Will also share notes with Dr. Hinton.  "

## 2022-10-05 ENCOUNTER — TELEPHONE (OUTPATIENT)
Dept: TRANSPLANT | Facility: CLINIC | Age: 52
End: 2022-10-05
Payer: MEDICARE

## 2022-10-05 NOTE — TELEPHONE ENCOUNTER
----- Message from Corine Becker sent at 10/4/2022  1:36 PM CDT -----  Pt would like to be called back regarding a medication  Psy  want to put her on for  ADD    Pt can be reached at  [phone]

## 2022-10-13 ENCOUNTER — OFFICE VISIT (OUTPATIENT)
Dept: TRANSPLANT | Facility: CLINIC | Age: 52
End: 2022-10-13
Payer: MEDICARE

## 2022-10-13 DIAGNOSIS — G47.30 SLEEP APNEA, UNSPECIFIED TYPE: ICD-10-CM

## 2022-10-13 DIAGNOSIS — F41.8 OTHER SPECIFIED ANXIETY DISORDERS: ICD-10-CM

## 2022-10-13 DIAGNOSIS — I10 ESSENTIAL HYPERTENSION: ICD-10-CM

## 2022-10-13 DIAGNOSIS — I27.21 WHO GROUP 1 PULMONARY ARTERIAL HYPERTENSION: Primary | ICD-10-CM

## 2022-10-13 LAB
EXT ALBUMIN: 4.2
EXT ALT: 47
EXT AST: 45
EXT BUN: 12
EXT CALCIUM: 9.1
EXT CHLORIDE: 103
EXT CREATININE: 0.83 MG/DL
EXT GLUCOSE: 113
EXT HEMATOCRIT: 46.1
EXT HEMOGLOBIN: 15.4
EXT MAGNESIUM: 2.2
EXT PLATELETS: 226
EXT POTASSIUM: 4
EXT SODIUM: 137 MMOL/L
EXT WBC: 7.7

## 2022-10-13 PROCEDURE — 99214 OFFICE O/P EST MOD 30 MIN: CPT | Mod: 95,,,

## 2022-10-13 PROCEDURE — 99214 PR OFFICE/OUTPT VISIT, EST, LEVL IV, 30-39 MIN: ICD-10-PCS | Mod: 95,,,

## 2022-10-13 RX ORDER — SPIRONOLACTONE 25 MG/1
25 TABLET ORAL DAILY
Qty: 30 TABLET | Refills: 11 | Status: SHIPPED | OUTPATIENT
Start: 2022-10-13 | End: 2023-08-24

## 2022-11-01 ENCOUNTER — PATIENT MESSAGE (OUTPATIENT)
Dept: TRANSPLANT | Facility: CLINIC | Age: 52
End: 2022-11-01
Payer: MEDICARE

## 2022-11-04 LAB
ANION GAP SERPL CALC-SCNC: 14 MMOL/L (ref ?–30)
CARBON DIOXIDE, CO2: 26.9 MMOL/L
DIRECT BILIRUBIN: 0.2
EXT ALBUMIN: 4.8
EXT ALT: 34
EXT AST: 36
EXT BILIRUBIN TOTAL: 1.4
EXT BUN: 11
EXT CALCIUM: 9.6
EXT CHLORIDE: 101
EXT CREATININE: 0.7 MG/DL
EXT GLUCOSE: 112
EXT POTASSIUM: 3.6
EXT PROTEIN TOTAL: 7.9
EXT SODIUM: 141 MMOL/L
NT-PRO-BNP (RIVER PARISHES): 19.3

## 2022-11-07 ENCOUNTER — PATIENT MESSAGE (OUTPATIENT)
Dept: TRANSPLANT | Facility: CLINIC | Age: 52
End: 2022-11-07
Payer: MEDICARE

## 2022-11-07 ENCOUNTER — TELEPHONE (OUTPATIENT)
Dept: TRANSPLANT | Facility: CLINIC | Age: 52
End: 2022-11-07
Payer: MEDICARE

## 2022-11-07 NOTE — TELEPHONE ENCOUNTER
External labs entered  Labs completed on 11/4/22  Labs have been routed to PH coordinator review

## 2022-11-30 ENCOUNTER — PATIENT MESSAGE (OUTPATIENT)
Dept: TRANSPLANT | Facility: CLINIC | Age: 52
End: 2022-11-30
Payer: MEDICARE

## 2023-03-13 ENCOUNTER — TELEPHONE (OUTPATIENT)
Dept: TRANSPLANT | Facility: CLINIC | Age: 53
End: 2023-03-13
Payer: MEDICARE

## 2023-03-13 NOTE — TELEPHONE ENCOUNTER
Patient called in because she has her disability review coming up and needs paperwork filled out. Patient is also due for a follow up appointment but states that she lives in [place], [place] and it is hard to get transportation. Nurse navigator asked patient specifically if she would ever be able to come in person to visit because eventually an in person appointment is needed especially since a RHC is needed and discussed at last visit and it would need to be done here. Patient states that she does not think she will be able to come to [place]. Patient was informed that nurse navigator will speak with STANLEY Feliz to see what can be done in regard to paperwork and what will need to happen since patient cannot come to [place]. Patient was given disability desk information to submit paperwork in case STANLEY Feliz can sign it but not a guarantee; patient verbalized understanding.    STANLEY Feliz notified.

## 2023-03-13 NOTE — TELEPHONE ENCOUNTER
----- Message from Kerry Estevez sent at 3/13/2023  2:07 PM CDT -----  Regarding: pt advice  Contact: self @ [phone] [email or web address]  Pt requesting call back from Dr. Felix. Pls call.

## 2023-03-14 ENCOUNTER — TELEPHONE (OUTPATIENT)
Dept: TRANSPLANT | Facility: CLINIC | Age: 53
End: 2023-03-14
Payer: MEDICARE

## 2023-03-14 DIAGNOSIS — I27.9 CHRONIC PULMONARY HEART DISEASE: Primary | ICD-10-CM

## 2023-03-14 DIAGNOSIS — Z79.899 POLYPHARMACY: ICD-10-CM

## 2023-03-14 NOTE — TELEPHONE ENCOUNTER
Nurse navigator called and spoke with patient and patient states that she can now come in for a follow up appointment because her friend will give her a ride to [place]. Per STANLEY Feliz- she would like RHC, labs, walk, and follow up. Message sent to schedulers. Patient is still getting paperwork ready for disability desk and will send it in as soon as she can.

## 2023-03-15 ENCOUNTER — TELEPHONE (OUTPATIENT)
Dept: TRANSPLANT | Facility: CLINIC | Age: 53
End: 2023-03-15
Payer: MEDICARE

## 2023-04-14 ENCOUNTER — TELEPHONE (OUTPATIENT)
Dept: OBSTETRICS AND GYNECOLOGY | Facility: CLINIC | Age: 53
End: 2023-04-14
Payer: MEDICARE

## 2023-04-14 NOTE — TELEPHONE ENCOUNTER
----- Message from Dianna Womack sent at 4/14/2023 11:58 AM CDT -----  Type:  Needs Medical Advice    Who Called: Selma Hartmann    Symptoms (please be specific): -   How long has patient had these symptoms:  -  Pharmacy name and phone #:  -  Would the patient rather a call back or a response via [facility]?    Best Call Back Number: [phone]    Additional Information:  pt needs to speak w/ office about Hormone therapy , please call

## 2023-04-17 ENCOUNTER — DOCUMENTATION ONLY (OUTPATIENT)
Dept: TRANSPLANT | Facility: CLINIC | Age: 53
End: 2023-04-17
Payer: MEDICARE

## 2023-04-17 NOTE — PROGRESS NOTES
04/17/2023   Selma Aubreeernestina Hartmann  [facility]        OUTPATIENT RIGHT HEART CATHETERIZATION INSTRUCTIONS     You have been scheduled for a procedure in the catheterization lab on 5/26/2023.      Please report to the Cardiology Waiting Area on the Third floor of the hospital and check in at 8:30 am.    You will then be taken to the SSCU (Short Stay Cardiac Unit) and prepared for your procedure. Please be aware that this is not the time of your procedure but the time you are to arrive. The procedures are scheduled on an hourly basis; however, emergency cases take precedence over all other cases.      You may eat a light meal before your procedure.    You may take your regular morning medications with water. If there are any medications that you should not take you will be instructed to hold them that morning.   If you are on Pradaxa, Eliquis or Coumadin , you can hold them 3 days prior to your procedure.  CALL US if you are on blood thinners for instructions. [phone]  Do not stop your Aspirin, Plavix, Effient, or Brilinta.    The procedure will take 30 minutes to perform. After the procedure, you will return to SSCU on the third floor of the hospital. Your family may remain in the room with you during this time.     You will be monitored for bleeding from the puncture site.  Your dressing may be removed the next day and dressed with a Band-Aid.  You may be able to be discharged home that same afternoon if there is someone to drive you home and there were no complications.     You may need to be admitted to the hospital after your procedure, so pack an overnight bag. Your doctor will determine, based on your progress, the date and time of your discharge. The results of your procedure will be discussed with you before you are discharged. Any further testing or procedures will be scheduled for you either before you leave or you will be called with these appointments.      If you should have any  questions, concerns, or need to change the date of your procedure, please call  Heart Transplant office and ask for your nurse. [phone]    Special Instructions:     THE ABOVE INSTRUCTIONS WERE GIVEN TO THE PATIENT VERBALLY AND THEY VERBALIZED UNDERSTANDING.  THEY DO NOT REQUIRE ANY SPECIAL NEEDS AND DO NOT HAVE ANY LEARNING BARRIERS.     Directions for Reporting to Cardiology Waiting Area in the Hospital  If you park in the Parking Garage:  Take elevators to the1st floor of the parking garage.  Continue past the gift shop, coffee shop, and piano.  Take a right and go to the gold elevators. (Elevator B)  Take the elevator to the 3rd floor.  Follow the arrow on the sign on the wall that says Cath Lab Registration/EP Lab Registration.  Follow the long hallway all the way around until you come to a big open area.  This is the registration area.  Check in at Reception Desk.     OR     If family is dropping you off:  Have them drop you off at the front of the Hospital under the green overhang.  Enter through the doors and take a right.  Take the E elevators to the 3rd floor Cardiology Waiting Area.  Check in at the Reception Desk in the waiting room.

## 2023-04-25 ENCOUNTER — OFFICE VISIT (OUTPATIENT)
Dept: OBSTETRICS AND GYNECOLOGY | Facility: CLINIC | Age: 53
End: 2023-04-25
Payer: MEDICARE

## 2023-04-25 VITALS
SYSTOLIC BLOOD PRESSURE: 125 MMHG | HEIGHT: 64 IN | HEART RATE: 86 BPM | DIASTOLIC BLOOD PRESSURE: 88 MMHG | WEIGHT: 176 LBS | BODY MASS INDEX: 30.05 KG/M2

## 2023-04-25 DIAGNOSIS — Z86.73 HISTORY OF CEREBROVASCULAR ACCIDENT (CVA) GREATER THAN EIGHT WEEKS IN THE PAST: ICD-10-CM

## 2023-04-25 DIAGNOSIS — E16.1 HYPERINSULINEMIA: Primary | ICD-10-CM

## 2023-04-25 DIAGNOSIS — Z86.718 PERSONAL HISTORY OF THROMBOEMBOLIC DISEASE: ICD-10-CM

## 2023-04-25 DIAGNOSIS — N95.1 MENOPAUSAL SYMPTOMS: ICD-10-CM

## 2023-04-25 PROCEDURE — 99203 PR OFFICE/OUTPT VISIT, NEW, LEVL III, 30-44 MIN: ICD-10-PCS | Mod: S$GLB,,, | Performed by: OBSTETRICS & GYNECOLOGY

## 2023-04-25 PROCEDURE — 99203 OFFICE O/P NEW LOW 30 MIN: CPT | Mod: S$GLB,,, | Performed by: OBSTETRICS & GYNECOLOGY

## 2023-04-25 RX ORDER — ARIPIPRAZOLE 10 MG/1
10 TABLET ORAL NIGHTLY
COMMUNITY
Start: 2023-02-27 | End: 2023-05-29

## 2023-04-25 RX ORDER — IBUPROFEN 800 MG/1
800 TABLET ORAL EVERY 12 HOURS PRN
COMMUNITY
Start: 2022-12-07 | End: 2023-09-21

## 2023-04-25 RX ORDER — DEXTROAMPHETAMINE SACCHARATE, AMPHETAMINE ASPARTATE MONOHYDRATE, DEXTROAMPHETAMINE SULFATE AND AMPHETAMINE SULFATE 5; 5; 5; 5 MG/1; MG/1; MG/1; MG/1
CAPSULE, EXTENDED RELEASE ORAL EVERY MORNING
COMMUNITY
Start: 2023-03-09 | End: 2023-04-25

## 2023-04-25 RX ORDER — ZOLPIDEM TARTRATE 10 MG/1
10 TABLET ORAL NIGHTLY
COMMUNITY
Start: 2023-03-29

## 2023-04-25 RX ORDER — DEXTROAMPHETAMINE SACCHARATE, AMPHETAMINE ASPARTATE, DEXTROAMPHETAMINE SULFATE AND AMPHETAMINE SULFATE 5; 5; 5; 5 MG/1; MG/1; MG/1; MG/1
1 TABLET ORAL 2 TIMES DAILY
COMMUNITY
Start: 2023-03-29 | End: 2023-04-25

## 2023-04-25 NOTE — PROGRESS NOTES
A 52-year-old female who is being seen at behavioral clinic [facility] she was found to haveAnswers submitted by the patient for this visit:  Gynecologic Exam Questionnaire  (Submitted on 2023)  Chief Complaint: Gynecologic exam  genital itching: No  genital lesions: No  genital odor: No  genital rash: No  missed menses: No  pelvic pain: No  vaginal bleeding: No  vaginal discharge: No  Pregnant now?: No  abdominal pain: No  anorexia: Yes  back pain: No  chills: No  constipation: No  diarrhea: No  discolored urine: No  dysuria: No  fever: No  flank pain: No  frequency: No  headaches: No  hematuria: No  nausea: No  painful intercourse: No  rash: No  urgency: No  vomiting: No  Vaginal bleeding: no bleeding  Passing clots?: No  Passing tissue?: No  STD: No  abdominal surgery: No   section: No  Ectopic pregnancy: No  Endometriosis: No  herpes simplex: No  gynecological surgery: No  menorrhagia: No  metrorrhagia: No  miscarriage: No  ovarian cysts: No  perineal abscess: No  PID: No  terminated pregnancy: No  vaginosis: No

## 2023-04-25 NOTE — PROGRESS NOTES
Subjective     Patient ID: Selma Hartmann is a 52 y.o. female.    Chief Complaint: Establish Care    [facility].  Insulin was over 300 blood sugar was slightly elevated hemoglobin A1c was less than 6 she has she just does not feel good and the behavioral people felt like it might be menopause she is been without a period from several years I am going to try her on FemHRT and will get her to see a primary physician to treat her elevated insulin and blood sugar    Gynecologic Exam  The patient's pertinent negatives include no genital itching, genital lesions, genital odor, genital rash, missed menses, pelvic pain, vaginal bleeding or vaginal discharge. She is not pregnant. Associated symptoms include anorexia. Pertinent negatives include no abdominal pain, back pain, chills, constipation, diarrhea, discolored urine, dysuria, fever, flank pain, frequency, headaches, hematuria, nausea, painful intercourse, rash, urgency or vomiting. There has been no bleeding. She has not been passing clots. She has not been passing tissue. There is no history of an abdominal surgery, a  section, an ectopic pregnancy, endometriosis, a gynecological surgery, herpes simplex, menorrhagia, metrorrhagia, miscarriage, ovarian cysts, perineal abscess, PID, an STD, a terminated pregnancy or vaginosis.   Review of Systems   Constitutional:  Negative for chills and fever.   Gastrointestinal:  Positive for anorexia. Negative for abdominal pain, constipation, diarrhea, nausea and vomiting.   Genitourinary:  Negative for dysuria, flank pain, frequency, hematuria, menorrhagia, missed menses, pelvic pain, urgency and vaginal discharge.   Musculoskeletal:  Negative for back pain.   Integumentary:  Negative for rash.   Neurological:  Negative for headaches.        Objective     Physical Exam       Assessment and Plan     Problem List Items Addressed This Visit     None

## 2023-05-25 ENCOUNTER — TELEPHONE (OUTPATIENT)
Dept: TRANSPLANT | Facility: CLINIC | Age: 53
End: 2023-05-25
Payer: MEDICARE

## 2023-05-25 NOTE — TELEPHONE ENCOUNTER
"NN contacted patient to confirm that patient was going to come to [facility] in the morning for her RHC.  Patient states, "I will be there." NN confirmed time as 0800.  "

## 2023-05-26 ENCOUNTER — HOSPITAL ENCOUNTER (OUTPATIENT)
Facility: HOSPITAL | Age: 53
Discharge: HOME | End: 2023-05-26
Attending: INTERNAL MEDICINE | Admitting: INTERNAL MEDICINE
Payer: MEDICARE

## 2023-05-26 VITALS
TEMPERATURE: 98 F | BODY MASS INDEX: 30.41 KG/M2 | HEIGHT: 63 IN | OXYGEN SATURATION: 95 % | DIASTOLIC BLOOD PRESSURE: 68 MMHG | HEART RATE: 71 BPM | WEIGHT: 171.63 LBS | SYSTOLIC BLOOD PRESSURE: 124 MMHG | RESPIRATION RATE: 18 BRPM

## 2023-05-26 DIAGNOSIS — I27.20 PULMONARY HYPERTENSION: ICD-10-CM

## 2023-05-26 PROCEDURE — C1894 INTRO/SHEATH, NON-LASER: HCPCS | Performed by: INTERNAL MEDICINE

## 2023-05-26 PROCEDURE — 93451 PR RIGHT HEART CATH O2 SATURATION & CARDIAC OUTPUT: ICD-10-PCS | Mod: 26,,, | Performed by: INTERNAL MEDICINE

## 2023-05-26 PROCEDURE — 93451 RIGHT HEART CATH: CPT | Mod: 26,,, | Performed by: INTERNAL MEDICINE

## 2023-05-26 PROCEDURE — C1751 CATH, INF, PER/CENT/MIDLINE: HCPCS | Performed by: INTERNAL MEDICINE

## 2023-05-26 PROCEDURE — 93451 RIGHT HEART CATH: CPT | Performed by: INTERNAL MEDICINE

## 2023-05-26 PROCEDURE — 25000003 PHARM REV CODE 250: Performed by: INTERNAL MEDICINE

## 2023-05-26 RX ORDER — LIDOCAINE HYDROCHLORIDE 20 MG/ML
INJECTION, SOLUTION EPIDURAL; INFILTRATION; INTRACAUDAL; PERINEURAL
Status: DISCONTINUED | OUTPATIENT
Start: 2023-05-26 | End: 2023-05-26 | Stop reason: HOSPADM

## 2023-05-26 NOTE — DISCHARGE SUMMARY
[facility] - Cath Lab  Discharge Note  Short Stay    Procedure(s) (LRB):  INSERTION, CATHETER, RIGHT HEART (Right)      OUTCOME: Patient tolerated treatment/procedure well without complication and is now ready for discharge.    DISPOSITION: Home or Self Care    FINAL DIAGNOSIS:  Pulmonary hypertension WHO group 1 pulmonary arterial hypertension      FOLLOWUP: In clinic    DISCHARGE INSTRUCTIONS:  No discharge procedures on file.     TIME SPENT ON DISCHARGE: 10 minutes

## 2023-05-26 NOTE — H&P
"H&P - Right heart catheterization     Patient:   Selma Hartmann          MRN: [id number]              CS: [id number]  Age:   52 y.o.      Sex:  female  :  1970   Author:   Tracy Collins     Basic Information      Date of Service: 2023    Service:  Heart Transplant.       Chief Complaint   Presents for elective right heart catheterization     History of Present Illness      51 y/o CF with PAH on [place] presenting for RHC     Review of Systems       All systems reviewed and negative as noted and/or described as follows:   .     Health Status   Allergies:    Review of patient's allergies indicates:   Allergen Reactions    Plavix [clopidogrel] Hives     .   Current medications: Reviewed, no changes..   Problem list: Reviewed, no changes..      Histories   Family History: Reviewed as doucmented in chart.  Non-contributory..   Social History     Reviewed as documented in chart (Non-contributory.).        Physical Examination   VS/Measurements       /82 (BP Location: Right arm, Patient Position: Sitting)   Pulse 83   Temp 98.6 °F (37 °C) (Temporal)   Resp 18   Ht 5' 3" (1.6 m)   Wt 77.9 kg (171 lb 10.1 oz)   SpO2 (!) 94%   Breastfeeding No   BMI 30.40 kg/m²     General:  Alert and oriented, No acute distress.    Neck:  No jugular venous distention.    Respiratory:  Lungs are clear to auscultation.    Cardiovascular:  Regular rhythm, No murmur, No edema.    Gastrointestinal:  Soft, Non-tender, No organomegaly.    Neurologic:  Alert, Oriented.       Impression and Plan   Diagnosis     1.  Pulmonary hypertension        -  Plan for right heart catheterization        -  Risks, benefits, and alternatives discussed and all questions answered        -  Patient wishes to proceed as planned.      "

## 2023-05-26 NOTE — PLAN OF CARE
Received report from MALINDA Rosales. Patient s/p RHC, AAOx3. VSS, no c/o pain or discomfort at this time, resp even and unlabored. Gauze/tegaderm dressing to R neck is CDI. No active bleeding. No hematoma noted. Post procedure protocol reviewed with patient and patient's family. Understanding verbalized. Family members at bedside. Nurse call bell within reach. Will continue to monitor per post procedure protocol.

## 2023-05-26 NOTE — DISCHARGE INSTRUCTIONS

## 2023-05-26 NOTE — Clinical Note
Manual pressure was applied to the right internal jugular vein sheath insertion site. rx sent called pt request antibiotic &   ENT referral orders for spec. In system

## 2023-05-26 NOTE — BRIEF OP NOTE
Patient tolerated the procedure well. Please see complete procedure note for full details and results. Stable to return from cath lab to their room.  Procedure: Right heart catheterization   Procedure date: 05/26/2023  Discharge date: 05/26/2023  Estimated blood loss: less than 10 cc  Complications: none  Condition at discharge: stable  Discharge instructions: no heavy lifting greater than 5 lbs and no bearing down/coughing without holding pressure over incision site.

## 2023-05-29 ENCOUNTER — OFFICE VISIT (OUTPATIENT)
Dept: TRANSPLANT | Facility: CLINIC | Age: 53
End: 2023-05-29
Payer: MEDICARE

## 2023-05-29 DIAGNOSIS — F41.8 OTHER SPECIFIED ANXIETY DISORDERS: ICD-10-CM

## 2023-05-29 DIAGNOSIS — I10 ESSENTIAL HYPERTENSION: ICD-10-CM

## 2023-05-29 DIAGNOSIS — G47.33 OSA ON CPAP: ICD-10-CM

## 2023-05-29 DIAGNOSIS — I27.21 WHO GROUP 1 PULMONARY ARTERIAL HYPERTENSION: Primary | ICD-10-CM

## 2023-05-29 PROCEDURE — 99214 PR OFFICE/OUTPT VISIT, EST, LEVL IV, 30-39 MIN: ICD-10-PCS | Mod: 95,,,

## 2023-05-29 PROCEDURE — 99214 OFFICE O/P EST MOD 30 MIN: CPT | Mod: 95,,,

## 2023-05-29 NOTE — PROGRESS NOTES
"Subjective:    Patient ID:  Selma Hartmann is a 52 y.o. female who presents for follow-up of Pulmonary Hypertension.    HPI     Mrs. Hartmann has WHO Group 1 PAH diagnosed by right heart cath. She is currently on  adempas 2.5 mg, TID. She has a history methamphetamine use (urine tox + OSH 12/3/19), Marijuana use, tob use HTN, DM2, HLP, h/o prescription opiate addiction, anxiety, h/o DVT after tubal ligation 12 yr ago,   Referred by Dr Alexander. Has remote hx of stroke in 2007 - had to learn to walk and talk again.    Mrs. Hartmann is doing okay. Does not get out much and says it's hard to tell if her breathing is worse or better. Can do most IADLs but will get SOB with shopping or increased activity. Is compliant with her PH medications, manages fluid with lasix, now using CPAP. She reports anxiety. Saw a psychiatrist who put her on Abilify, but took her off when she decided her "symptoms were from menopause." Mrs. Clancy is afraid of losing disability because she has not worked in 3 years and not sure she can. She struggled with work because she was on her feet for long periods and doing physical activity.      6MWT:  6MW 2/26/2021   6MWT Status completed without stopping   Patient Reported Dyspnea   Was O2 used? No   6MW Distance walked (feet) 1477   Distance walked (meters) 450.19   Did patient stop? No   Oxygen Saturation 95   Supplemental Oxygen Room Air   Heart Rate 98   Blood Pressure 106/66   Layla Dyspnea Rating  moderate   Oxygen Saturation 94   Supplemental Oxygen Room Air   Heart Rate 110   Blood Pressure 116/71   Layla Dyspnea Rating  somewhat heavy   Recovery Time (seconds) 47   Oxygen Saturation 95   Supplemental Oxygen Room Air   Heart Rate 100     ECHO: 9/6/2022 - In media tab (PASP 39, Normal RV size/function, Grade 1 DDx)      ECHO: 2/26/2021  The left ventricle is normal in size with normal systolic function. The estimated ejection fraction is 65%  Normal left ventricular diastolic " function.  There is abnormal septal wall motion.  Mild right ventricular enlargement with low normal right ventricular systolic function.  Mild tricuspid regurgitation.  Mild left atrial enlargement.  Normal central venous pressure (3 mmHg).  The estimated PA systolic pressure is 32 mmHg.  Very poor TR envelope    ECHO 12/2/2019  Normal LV size, LV thickness mild-mod increased, EF 55-60%  E/A reversal  RV severely dilated, D shaped septum  Normal LA  Mild ISSA  RVSP 110  No effusion  RAP 15    RHC: 5/26/2023  RA 5  RV 34/5  PA  34/16/22  PCWP 7  PA saturation 70%  Ao saturation 95%  CO/CI 4.1/2.26  PVR 3.7 SHELTON, consistent with pre-capillary pulmonary arterial hypertension  SVR 1997  BP  136/93/107  HR 65 SR  Hb 17.3    RHC: 11/9/2021  RA: 5 PA: 45/ 20 PWP: 10 .   Cardiac output was 5.6. Cardiac index is 3.1 L/min/m2.   RHC performed via the right IJ. Patient tolerated the procedure well. Normal left and right side filling pressures (RA= 5, PCWP=10 mm of Hg). Mild pulmonary HTN  (PA=45/20 mm of Hg, PA mean=33 mm of Hg. Normal cardiac index and output  (CI=3.1 L/min/2, CO=5.6 L/min).    RHC 12/23/19  Severe Pulmonary arterial hypertension (treatment naive)  Normal right and left-sided filling pressures.  Normal cardiac output and index by Mil method.  No response to inhaled nitric oxide.  RA: 8/ 7/ 5 RV: 75/ -5/ 4 PA: 83/ 35/ 51 PWP: 12/ 0/ 10 . Cardiac output was 4.35 by Mil. Cardiac index is 2.44 L/min/m2. O2 Sat: PA 69%. Pulmonary vascular resistance: 9.4. /85 AO sat (92%) Bryanna 20 ppm PA 78/32 (47) 40 ppm PA 72/30 (45) 80 ppm PA 72/30 (45)    CT Chest  12  /  3  /   19  (-) PE, main PA 3.5 cm, normal lung parenchyma    PFTs    12 / 4 /19     FVC    3.3  L  98    % pred  FEV1   2.47 L   92  % pred  FEV1/FVC  75    %  TLC  3.99 L 84 %pred  DLCOadj   73  % pred       Review of Systems   Constitutional: Positive for malaise/fatigue and weight loss. Negative for fever.   HENT: Negative.     Cardiovascular:   Positive for dyspnea on exertion and orthopnea. Negative for chest pain, near-syncope and syncope.   Respiratory:  Positive for sleep disturbances due to breathing (CPAP). Negative for cough and wheezing.    Endocrine: Negative.    Hematologic/Lymphatic: Negative.    Skin: Negative.    Musculoskeletal: Negative.    Gastrointestinal: Negative.    Genitourinary: Negative.    Neurological: Negative.    Psychiatric/Behavioral:  Negative for altered mental status. The patient is nervous/anxious. The patient does not have insomnia.       Lab Results   Component Value Date    BNP 19.3 11/04/2022     05/26/2023    K 3.5 05/26/2023    MG 2.0 11/09/2021     05/26/2023    CO2 24 05/26/2023    CO2 26.9 11/04/2022    BUN 8 05/26/2023    CREATININE 0.8 05/26/2023     (H) 05/26/2023    AST 18 05/26/2023    ALT 20 05/26/2023    ALBUMIN 4.1 05/26/2023    PROT 7.1 05/26/2023    BILITOT 1.4 (H) 05/26/2023       Magnesium   Date Value Ref Range Status   11/09/2021 2.0 1.6 - 2.6 mg/dL Final       Lab Results   Component Value Date    WBC 6.05 05/26/2023    HGB 17.3 (H) 05/26/2023    HCT 50.8 (H) 05/26/2023    MCV 90 05/26/2023     05/26/2023       Lab Results   Component Value Date    INR 0.9 11/09/2021    INR 1.0 12/23/2019       NT-pro-BNP  ([place])   Date Value Ref Range Status   11/04/2022 19.3  Final     BNP   Date Value Ref Range Status   11/09/2021 14 0 - 99 pg/mL Final     Comment:     Values of less than 100 pg/ml are consistent with non-CHF populations.   02/26/2021 <10 0 - 99 pg/mL Final     Comment:     Values of less than 100 pg/ml are consistent with non-CHF populations.   12/17/2019 43 0 - 99 pg/mL Final     Comment:     Values of less than 100 pg/ml are consistent with non-CHF populations.       No results found for: LDH        WHO Group: 1 Functional Class: II REVEAL Score: 3 (Low Risk)    Assessment:       1. WHO group 1 pulmonary arterial hypertension    2. LISA on CPAP    3. Other  specified anxiety disorders    4. Essential hypertension         Plan:       Mrs. Hartmann's RHC shows mild pulmonary hypertension on monotherapy. Has not had recent CT Chest, PFTS, or 6 minute walk. Will try to schedule this close to home.     Reinforced need to use CPAP consistently.      Feel that Mrs. Hartmann's mental healt is not being addressed. She also needs to follow up with her primary care provider.      Will await test results to determine next steps.    Recommend 2 gram sodium restriction and 1500cc fluid restriction.  Encourage physical activity with graded exercise program.  Requested patient to weigh themselves daily, and to notify us if their weight increases by more than 3 lbs in 1 day or 5 lbs in 1 week.

## 2023-06-08 ENCOUNTER — PATIENT MESSAGE (OUTPATIENT)
Dept: TRANSPLANT | Facility: CLINIC | Age: 53
End: 2023-06-08
Payer: MEDICARE

## 2023-06-12 ENCOUNTER — HOSPITAL ENCOUNTER (OUTPATIENT)
Dept: RADIOLOGY | Facility: HOSPITAL | Age: 53
Discharge: HOME | End: 2023-06-12
Payer: MEDICARE

## 2023-06-12 ENCOUNTER — PATIENT MESSAGE (OUTPATIENT)
Dept: TRANSPLANT | Facility: CLINIC | Age: 53
End: 2023-06-12
Payer: MEDICARE

## 2023-06-12 ENCOUNTER — PROCEDURE VISIT (OUTPATIENT)
Dept: RESPIRATORY THERAPY | Facility: HOSPITAL | Age: 53
End: 2023-06-12
Payer: MEDICARE

## 2023-06-12 ENCOUNTER — HOSPITAL ENCOUNTER (OUTPATIENT)
Dept: PULMONOLOGY | Facility: HOSPITAL | Age: 53
Discharge: HOME | End: 2023-06-12
Payer: MEDICARE

## 2023-06-12 DIAGNOSIS — I27.21 WHO GROUP 1 PULMONARY ARTERIAL HYPERTENSION: ICD-10-CM

## 2023-06-12 DIAGNOSIS — R06.02 SOB (SHORTNESS OF BREATH): Primary | ICD-10-CM

## 2023-06-12 LAB
DLCO ADJ PRE: 17.18 ML/(MIN*MMHG) (ref 17.31–28.78)
DLCO SINGLE BREATH LLN: 17.31
DLCO SINGLE BREATH PRE REF: 74.5 %
DLCO SINGLE BREATH REF: 23.05
DLCOC SBVA LLN: 3.3
DLCOC SBVA PRE REF: 85.8 %
DLCOC SBVA REF: 4.9
DLCOC SINGLE BREATH LLN: 17.31
DLCOC SINGLE BREATH PRE REF: 74.5 %
DLCOC SINGLE BREATH REF: 23.05
DLCOVA LLN: 3.3
DLCOVA PRE REF: 85.8 %
DLCOVA PRE: 4.2 ML/(MIN*MMHG*L) (ref 3.3–6.5)
DLCOVA REF: 4.9
DLVAADJ PRE: 4.2 ML/(MIN*MMHG*L) (ref 3.3–6.5)
ERV LLN: -16449.1
ERV PRE REF: 72 %
ERV REF: 0.9
FEF 25 75 LLN: 1.39
FEF 25 75 PRE REF: 99.8 %
FEF 25 75 REF: 2.53
FEV1 FVC LLN: 69
FEV1 FVC PRE REF: 99.8 %
FEV1 FVC REF: 80
FEV1 LLN: 2
FEV1 PRE REF: 95.5 %
FEV1 REF: 2.58
FRCPLETH LLN: 1.79
FRCPLETH PREREF: 96.6 %
FRCPLETH REF: 2.61
FVC LLN: 2.5
FVC PRE REF: 95.2 %
FVC REF: 3.22
IVC PRE: 2.46 L (ref 2.5–3.94)
IVC SINGLE BREATH LLN: 2.5
IVC SINGLE BREATH PRE REF: 76.5 %
IVC SINGLE BREATH REF: 3.22
MVV LLN: 81
MVV PRE REF: 69.3 %
MVV REF: 95
PEF LLN: 4.81
PEF PRE REF: 74.5 %
PEF REF: 6.45
PRE DLCO: 17.18 ML/(MIN*MMHG) (ref 17.31–28.78)
PRE ERV: 0.65 L (ref -16449.1–16450.9)
PRE FEF 25 75: 2.53 L/S (ref 1.39–3.67)
PRE FET 100: 8.41 SEC
PRE FEV1 FVC: 80.24 % (ref 69.08–91.75)
PRE FEV1: 2.46 L (ref 2–3.16)
PRE FRC PL: 2.53 L
PRE FVC: 3.07 L (ref 2.5–3.94)
PRE MVV: 66 L/MIN (ref 80.94–109.5)
PRE PEF: 4.81 L/S (ref 4.81–8.09)
PRE RV: 1.43 L (ref 1.13–2.29)
PRE TLC: 4.5 L (ref 3.72–5.69)
RAW LLN: 3.06
RAW PRE REF: 83.2 %
RAW PRE: 2.55 CMH2O*S/L (ref 3.06–3.06)
RAW REF: 3.06
RV LLN: 1.13
RV PRE REF: 83.5 %
RV REF: 1.71
RVTLC LLN: 27
RVTLC PRE REF: 86.7 %
RVTLC PRE: 31.77 % (ref 27.05–46.23)
RVTLC REF: 37
TLC LLN: 3.72
TLC PRE REF: 95.6 %
TLC REF: 4.7
VA PRE: 4.09 L (ref 4.55–4.55)
VA SINGLE BREATH LLN: 4.55
VA SINGLE BREATH PRE REF: 89.7 %
VA SINGLE BREATH REF: 4.55
VC LLN: 2.5
VC PRE REF: 95.2 %
VC PRE: 3.07 L (ref 2.5–3.94)
VC REF: 3.22
VTGRAWPRE: 2.67 L

## 2023-06-12 PROCEDURE — 99900035 HC TECH TIME PER 15 MIN (STAT)

## 2023-06-12 PROCEDURE — 94010 BREATHING CAPACITY TEST: CPT

## 2023-06-12 PROCEDURE — 99900031 HC PATIENT EDUCATION (STAT)

## 2023-06-12 PROCEDURE — 94727 GAS DIL/WSHOT DETER LNG VOL: CPT

## 2023-06-12 PROCEDURE — 71250 CT THORAX DX C-: CPT | Mod: TC

## 2023-06-12 PROCEDURE — 94729 DIFFUSING CAPACITY: CPT

## 2023-06-19 ENCOUNTER — TELEPHONE (OUTPATIENT)
Dept: PULMONOLOGY | Facility: CLINIC | Age: 53
End: 2023-06-19
Payer: MEDICARE

## 2023-06-19 ENCOUNTER — PATIENT MESSAGE (OUTPATIENT)
Dept: TRANSPLANT | Facility: CLINIC | Age: 53
End: 2023-06-19
Payer: MEDICARE

## 2023-06-19 NOTE — TELEPHONE ENCOUNTER
Attempted to return call no answer left voice mails to return call and that we did 6MWT. LB  ----- Message from Anna Roy sent at 2023  8:37 AM CDT -----  Regardin minute Walk  Contact: Faith [facility]  Per phone call with Faith, she wanted to know do we offer the 6 minute walk here at this facility.  If so she would like to schedule the patient for an appointment.  Please return call at [phone].    Thanks,  SJ

## 2023-06-26 ENCOUNTER — CLINICAL SUPPORT (OUTPATIENT)
Dept: PULMONOLOGY | Facility: CLINIC | Age: 53
End: 2023-06-26
Payer: MEDICARE

## 2023-06-26 VITALS — HEIGHT: 63 IN | WEIGHT: 165 LBS | BODY MASS INDEX: 29.23 KG/M2

## 2023-06-26 DIAGNOSIS — I27.9 CHRONIC PULMONARY HEART DISEASE: ICD-10-CM

## 2023-06-26 PROCEDURE — 94618 PULMONARY STRESS TESTING: CPT | Mod: S$GLB,,, | Performed by: INTERNAL MEDICINE

## 2023-06-26 PROCEDURE — 94618 PULMONARY STRESS TESTING: ICD-10-PCS | Mod: S$GLB,,, | Performed by: INTERNAL MEDICINE

## 2023-06-26 NOTE — PROCEDURES
"[facility] - Pulmonary Function  Six Minute Walk     SUMMARY     Ordering Provider: Kell Felix NP   Interpreting Provider: Joni Roberts MD  Performing nurse/tech/RT: Avery RRT  Diagnosis: COPD  Height: 5' 3" (160 cm)  Weight: 74.8 kg (165 lb)  BMI (Calculated): 29.2   Patient Race:             Phase Oxygen Assessment Supplemental O2 Heart   Rate Blood Pressure Layla Dyspnea Scale Rating   Resting 96 % Room Air 69 bpm 122/83 3   Exercise        Minute        1 97 % Room Air 94 bpm     2 96 % Room Air 93 bpm     3 97 % Room Air 91 bpm     4 97 % Room Air 93 bpm     5 97 % Room Air 95 bpm     6  97 % Room Air 90 bpm 137/81 7-8   Recovery        Minute        1 97 % Room Air 80 bpm     2 98 % Room Air 75 bpm     3 97 % Room Air 76 bpm     4 99 % Room Air 74 bpm 118/84 4     Six Minute Walk Summary  6MWT Status: completed without stopping  Patient Reported: Dyspnea, Lightheadedness, Dizziness     Interpretation:  Did the patient stop or pause?: No                                         Total Time Walked (Calculated): 360 seconds  Final Partial Lap Distance (feet): 50 feet  Total Distance Meters (Calculated): 320.04 meters  Predicted Distance Meters (Calculated): 532.65 meters  Percentage of Predicted (Calculated): 60.08  Peak VO2 (Calculated): 13.58  Mets: 3.88  Has The Patient Had a Previous Six Minute Walk Test?: Yes       Previous 6MWT Results  Has The Patient Had a Previous Six Minute Walk Test?: Yes  Date of Previous Test: 02/26/21  Total Time Walked: 360 seconds  Total Distance (meters): 450.19  Predicted Distance (meters): 401.8 meters  Percent Change (Calculated): 0.29    Physician interpretation:     No exertional hypoxia noted.    Patient walked 320 m in 6 minutes which is 60% of predicted distance.    Layla score went up from 3 to 7-8.    Baseline heart rate was 69 which went up to 90 at the end of exercise.  Baseline blood pressure 122/83 went up to 137/81.  "

## 2023-08-24 RX ORDER — SPIRONOLACTONE 25 MG/1
25 TABLET ORAL
Qty: 30 TABLET | Refills: 11 | Status: SHIPPED | OUTPATIENT
Start: 2023-08-24

## 2023-09-12 NOTE — PROGRESS NOTES
Subjective:    Patient ID:  Selma Hartmann is a 51 y.o. female who presents for follow-up of Pulmonary Hypertension.    Audio Only Telehealth Visit     The patient location is: Home  The chief complaint leading to consultation is: Pulmonary Hypertension  Visit type: Virtual visit with audio only (telephone)  Total time spent with patient: 30     The reason for the audio only service rather than synchronous audio and video virtual visit was related to technical difficulties or patient preference/necessity.     Each patient to whom I provide medical services by telemedicine is:  (1) informed of the relationship between the physician and patient and the respective role of any other health care provider with respect to management of the patient; and (2) notified that they may decline to receive medical services by telemedicine and may withdraw from such care at any time. Patient verbally consented to receive this service via voice-only telephone call.      This service was not originating from a related E/M service provided within the previous 7 days nor will  to an E/M service or procedure within the next 24 hours or my soonest available appointment.  Prevailing standard of care was able to be met in this audio-only visit.       HPI     Mrs. Hartmann has WHO Group 1 PAH diagnosed by right heart cath. She is currently on  adempas 2.5 mg, TID. She has a history methamphetamine use (urine tox + OSH 12/3/19), MJ use, tob use HTN, DM2, HLP, h/o prescription opiate addiction, anxiety, h/o DVT after tubal ligation 12 yr ago,   Referred by Dr Burkula   Has remote hx of stroke in 2007 - had to learn to walk and talk again.    Mrs. Hartmann has been clean for 2+ years. She is doing fairly well. Reports adherence to PH medication regimen. Manages fluid with 40 mg lasix daily. Tries to watch her diet. Uses CPAP. Patient denies chest pain, chest pressure, syncope, pre-syncope, lightheadedness, dizziness, PND,  The ABCs of the Annual Wellness Visit  Subsequent Medicare Wellness Visit    Subjective      Izaiah Garcia is a 89 y.o. male who presents for a Subsequent Medicare Wellness Visit.    The following portions of the patient's history were reviewed and   updated as appropriate: allergies, current medications, past family history, past medical history, past social history, past surgical history, and problem list.    Compared to one year ago, the patient feels his physical   health is the same.    Compared to one year ago, the patient feels his mental   health is the same.    Recent Hospitalizations:  He was not admitted to the hospital during the last year.       Current Medical Providers:  Patient Care Team:  Uriah Godinez MD as PCP - General (Internal Medicine)    Outpatient Medications Prior to Visit   Medication Sig Dispense Refill    allopurinol (ZYLOPRIM) 300 MG tablet TAKE 1 TABLET EVERY DAY  FOR  GOUT 90 tablet 3    carbidopa-levodopa (SINEMET)  MG per tablet Take 1 tablet by mouth 2 (Two) Times a Day.      Cholecalciferol (vitamin D3) 125 MCG (5000 UT) capsule capsule 1 by mouth daily as directed 30 capsule     fluorometholone (FML) 0.1 % ophthalmic suspension Administer 1 drop to both eyes 2 (Two) Times a Day.      furosemide (LASIX) 40 MG tablet TAKE 1 TABLET EVERY DAY 90 tablet 2    ketorolac (ACULAR) 0.5 % ophthalmic solution       metoprolol tartrate (LOPRESSOR) 25 MG tablet TAKE 1 TABLET BY MOUTH EVERY 12 (TWELVE) HOURS. 180 tablet 3    Nutritional Supplements (COLD AND FLU PO) Take  by mouth.      rivaroxaban (XARELTO) 15 MG tablet Take 1 p.o. daily for blood thinner.  Take at same time every day. 90 tablet 3     No facility-administered medications prior to visit.       No opioid medication identified on active medication list. I have reviewed chart for other potential  high risk medication/s and harmful drug interactions in the elderly.        Aspirin is not on active medication list.   "Aspirin use is contraindicated for this patient due to: current use of Xarelto.  .    Patient Active Problem List   Diagnosis    Hyperlipidemia    Benign essential hypertension    History of gout    History of esophageal stricture    History of stroke, 6/29/2016--4.9 x 4 x 3 cm inferior left cerebellar infarct    Rheumatoid factor positive    Impaired fasting glucose    At risk for falls    Bilateral high frequency sensorineural hearing loss, wears hearing aids    Bilateral lower extremity edema    Parkinson's disease    Therapeutic drug monitoring    Vitamin D deficiency    Macrocytosis    Vitamin B12 deficiency    History of pulmonary embolus (PE)    Chronic anticoagulation, Eliquis.  Saddle pulmonary embolism    Paroxysmal atrial fibrillation    Benign prostatic hyperplasia without lower urinary tract symptoms    Statin intolerance, muscle pain and cramps    Chronic venous insufficiency    Petechiae or ecchymoses, both lower extremities.  Patient on Xarelto.     Advance Care Planning   Advance Care Planning     Advance Directive is not on file.  ACP discussion was held with the patient during this visit. Patient does not have an advance directive, information provided.     Objective    Vitals:    09/12/23 1330   BP: 118/70   Pulse: 73   Temp: 96.7 °F (35.9 °C)   SpO2: 97%   Weight: 86.2 kg (190 lb)   Height: 180 cm (70.87\")     Estimated body mass index is 26.6 kg/m² as calculated from the following:    Height as of this encounter: 180 cm (70.87\").    Weight as of this encounter: 86.2 kg (190 lb).    BMI is >= 25 and <30. (Overweight) The following options were offered after discussion;: none (medical contraindication)      Does the patient have evidence of cognitive impairment?   No            HEALTH RISK ASSESSMENT    Smoking Status:  Social History     Tobacco Use   Smoking Status Never   Smokeless Tobacco Never     Alcohol Consumption:  Social History     Substance and Sexual Activity   Alcohol Use Never    " orthopnea, LE edema, abdominal pain, abdominal pressure, or N/V/F/C.  Spoke to Mrs. Hartmann on 9/8 and at that time she was seeing a behavior specialist for her depression and anxiety. She is now on abilify and continuing with mental health care. Reviewed ECHO and labs with patient. We discussed the importance of wearing CPAP consistently.    6MWT:  6MW 2/26/2021   6MWT Status completed without stopping   Patient Reported Dyspnea   Was O2 used? No   6MW Distance walked (feet) 1477   Distance walked (meters) 450.19   Did patient stop? No   Oxygen Saturation 95   Supplemental Oxygen Room Air   Heart Rate 98   Blood Pressure 106/66   Layla Dyspnea Rating  moderate   Oxygen Saturation 94   Supplemental Oxygen Room Air   Heart Rate 110   Blood Pressure 116/71   Layla Dyspnea Rating  somewhat heavy   Recovery Time (seconds) 47   Oxygen Saturation 95   Supplemental Oxygen Room Air   Heart Rate 100     ECHO: 9/6/2022 - In media tab (PASP 39, Normal RV size/function, Grade 1 DDx)      ECHO: 2/26/2021  The left ventricle is normal in size with normal systolic function. The estimated ejection fraction is 65%  Normal left ventricular diastolic function.  There is abnormal septal wall motion.  Mild right ventricular enlargement with low normal right ventricular systolic function.  Mild tricuspid regurgitation.  Mild left atrial enlargement.  Normal central venous pressure (3 mmHg).  The estimated PA systolic pressure is 32 mmHg.  Very poor TR envelope    ECHO 12/2/2019  Normal LV size, LV thickness mild-mod increased, EF 55-60%  E/A reversal  RV severely dilated, D shaped septum  Normal LA  Mild ISSA  RVSP 110  No effusion  RAP 15    RHC: 11/9/2021  RA: 5 PA: 45/ 20 PWP: 10 .   Cardiac output was 5.6. Cardiac index is 3.1 L/min/m2.   RHC performed via the right IJ. Patient tolerated the procedure well. Normal left and right side filling pressures (RA= 5, PCWP=10 mm of Hg). Mild pulmonary HTN  (PA=45/20 mm of Hg, PA mean=33 mm of  Comment: occ     Fall Risk Screen:    LINDSAYADI Fall Risk Assessment was completed, and patient is at LOW risk for falls.Assessment completed on:2023    Depression Screenin/12/2023     1:35 PM   PHQ-2/PHQ-9 Depression Screening   Little Interest or Pleasure in Doing Things 0-->not at all   Feeling Down, Depressed or Hopeless 0-->not at all   PHQ-9: Brief Depression Severity Measure Score 0       Health Habits and Functional and Cognitive Screenin/12/2023     1:34 PM   Functional & Cognitive Status   Do you have difficulty preparing food and eating? No   Do you have difficulty bathing yourself, getting dressed or grooming yourself? No   Do you have difficulty using the toilet? No   Do you have difficulty moving around from place to place? No   Do you have trouble with steps or getting out of a bed or a chair? No   Current Diet Well Balanced Diet   Dental Exam Up to date   Eye Exam Up to date   Current Exercises Include Other   Do you need help using the phone?  No   Are you deaf or do you have serious difficulty hearing?  Yes   Do you need help to go to places out of walking distance? No   Do you need help shopping? No   Do you need help preparing meals?  No   Do you need help with housework?  No   Do you need help with laundry? No   Do you need help taking your medications? No   Do you need help managing money? No   Do you ever drive or ride in a car without wearing a seat belt? No       Age-appropriate Screening Schedule:  Refer to the list below for future screening recommendations based on patient's age, sex and/or medical conditions. Orders for these recommended tests are listed in the plan section. The patient has been provided with a written plan.    Health Maintenance   Topic Date Due    ANNUAL WELLNESS VISIT  2023    INFLUENZA VACCINE  10/01/2023    LIPID PANEL  2023    Pneumococcal Vaccine 65+  Completed    COVID-19 Vaccine  Discontinued    TDAP/TD VACCINES  Discontinued     Hg. Normal cardiac index and output  (CI=3.1 L/min/2, CO=5.6 L/min).    RHC 12/23/19  Severe Pulmonary arterial hypertension (treatment naive)  Normal right and left-sided filling pressures.  Normal cardiac output and index by Mil method.  No response to inhaled nitric oxide.  RA: 8/ 7/ 5 RV: 75/ -5/ 4 PA: 83/ 35/ 51 PWP: 12/ 0/ 10 . Cardiac output was 4.35 by Mil. Cardiac index is 2.44 L/min/m2. O2 Sat: PA 69%. Pulmonary vascular resistance: 9.4. /85 AO sat (92%) Bryanna 20 ppm PA 78/32 (47) 40 ppm PA 72/30 (45) 80 ppm PA 72/30 (45)    CT Chest  12  /  3  /   19  (-) PE, main PA 3.5 cm, normal lung parenchyma    PFTs    12 / 4 /19     FVC    3.3  L  98    % pred  FEV1   2.47 L   92  % pred  FEV1/FVC  75    %  TLC  3.99 L 84 %pred  DLCOadj   73  % pred       Review of Systems   Constitutional: Positive for malaise/fatigue and weight loss. Negative for fever.   HENT: Negative.     Cardiovascular:  Positive for dyspnea on exertion and orthopnea. Negative for chest pain, near-syncope and syncope.   Respiratory:  Positive for sleep disturbances due to breathing (CPAP). Negative for cough and wheezing.    Endocrine: Negative.    Hematologic/Lymphatic: Negative.    Skin: Negative.    Musculoskeletal: Negative.    Gastrointestinal: Negative.    Genitourinary: Negative.    Neurological: Negative.    Psychiatric/Behavioral:  Negative for altered mental status. The patient is nervous/anxious. The patient does not have insomnia.       Labs: 9/6/2022  EXT WBC  7.7    EXT Hemoglobin  15.4    EXT Hematocrit  46.1    EXT Platelets  226    EXT Glucose  113    EXT BUN  12.0    EXT Creatinine mg/dL 0.826    EXT Sodium mmol/L 137    EXT Potassium  4.0    EXT Chloride  103    EXT Calcium  9.1    EXT Magnesium  2.2    EXT Albumin  4.2    EXT AST  45    EXT ALT  47        WHO Group: 1 Functional Class: II REVEAL Score: 3 (Low Risk)    Assessment:       1. WHO group 1 pulmonary arterial hypertension    2. Essential hypertension     3. Sleep apnea, unspecified type    4. Other specified anxiety disorders         Plan:       Mrs. Hartmann is stable on current therapy. Last RHC shows improved numbers, but still pulmonary hypertension. Will need to do a repeat right heart cath in the next 6 months but she is limited because of transportation and resources.     Noted diastolic dysfunction on ECHO. Add spironolactone to med regimen. Repeat labs on Friday.    Reinforced need to use CPAP consistently.      Mrs. Hartmann priority concern is her mental health in relationship to a chronic, life-limiting, fatal disease. Reinforced need to continue following with mental health counselors.     Asked Mrs. Hartmann to keep us updated on how she is feeling. PH is a progressive disease and she may require additional medication.    Recommend 2 gram sodium restriction and 1500cc fluid restriction.  Encourage physical activity with graded exercise program.  Requested patient to weigh themselves daily, and to notify us if their weight increases by more than 3 lbs in 1 day or 5 lbs in 1 week.        ZOSTER VACCINE  Discontinued    BMI FOLLOWUP  Discontinued                  CMS Preventative Services Quick Reference  Risk Factors Identified During Encounter:    Fall Risk-High or Moderate: Discussed Fall Prevention in the home  Glaucoma or Family History of Glaucoma:  Condition Stable with Current Medications  Immunizations Discussed/Encouraged: Influenza and COVID19  Inactivity/Sedentary:  November    The above risks/problems have been discussed with the patient.  Pertinent information has been shared with the patient in the After Visit Summary.    Diagnoses and all orders for this visit:    1. Encounter for subsequent annual wellness visit (AWV) in Medicare patient (Primary)    2. At risk for falls    3. Benign prostatic hyperplasia without lower urinary tract symptoms    4. Bilateral high frequency sensorineural hearing loss, wears hearing aids    5. Benign essential hypertension    6. Bilateral lower extremity edema    7. Chronic anticoagulation, Eliquis.  Saddle pulmonary embolism    8. Chronic venous insufficiency    9. History of esophageal stricture    10. History of gout    11. History of pulmonary embolus (PE)    12. History of stroke, 6/29/2016--4.9 x 4 x 3 cm inferior left cerebellar infarct    13. Hyperlipidemia    14. Macrocytosis    15. Parkinson's disease    16. Paroxysmal atrial fibrillation    17. Petechiae or ecchymoses, both lower extremities.  Patient on Xarelto.    18. Rheumatoid factor positive    19. Statin intolerance, muscle pain and cramps    20. Vitamin B12 deficiency    21. Vitamin D deficiency    22. Therapeutic drug monitoring        Follow Up:   Next Medicare Wellness visit to be scheduled in 1 year.      An After Visit Summary and PPPS were made available to the patient.

## 2023-09-21 ENCOUNTER — OFFICE VISIT (OUTPATIENT)
Dept: OBSTETRICS AND GYNECOLOGY | Facility: CLINIC | Age: 53
End: 2023-09-21
Payer: MEDICARE

## 2023-09-21 VITALS
BODY MASS INDEX: 31.18 KG/M2 | SYSTOLIC BLOOD PRESSURE: 156 MMHG | HEART RATE: 105 BPM | DIASTOLIC BLOOD PRESSURE: 97 MMHG | WEIGHT: 176 LBS

## 2023-09-21 DIAGNOSIS — Z12.4 SCREENING FOR MALIGNANT NEOPLASM OF THE CERVIX: Primary | ICD-10-CM

## 2023-09-21 DIAGNOSIS — N93.8 DUB (DYSFUNCTIONAL UTERINE BLEEDING): ICD-10-CM

## 2023-09-21 PROCEDURE — 99213 PR OFFICE/OUTPT VISIT, EST, LEVL III, 20-29 MIN: ICD-10-PCS | Mod: S$GLB,,, | Performed by: OBSTETRICS & GYNECOLOGY

## 2023-09-21 PROCEDURE — 99213 OFFICE O/P EST LOW 20 MIN: CPT | Mod: S$GLB,,, | Performed by: OBSTETRICS & GYNECOLOGY

## 2023-09-21 RX ORDER — DEXTROAMPHETAMINE SACCHARATE, AMPHETAMINE ASPARTATE, DEXTROAMPHETAMINE SULFATE AND AMPHETAMINE SULFATE 5; 5; 5; 5 MG/1; MG/1; MG/1; MG/1
1 TABLET ORAL 2 TIMES DAILY
COMMUNITY
Start: 2023-09-06

## 2023-09-21 RX ORDER — FERROUS SULFATE 325(65) MG
325 TABLET, DELAYED RELEASE (ENTERIC COATED) ORAL DAILY
Qty: 21 TABLET | Refills: 0 | Status: SHIPPED | OUTPATIENT
Start: 2023-09-21 | End: 2023-10-12

## 2023-09-21 RX ORDER — MEDROXYPROGESTERONE ACETATE 10 MG/1
10 TABLET ORAL DAILY
Qty: 10 TABLET | Refills: 0 | Status: SHIPPED | OUTPATIENT
Start: 2023-09-21 | End: 2023-10-03

## 2023-09-21 RX ORDER — CARIPRAZINE 1.5 MG/1
CAPSULE, GELATIN COATED ORAL
COMMUNITY
Start: 2023-09-19

## 2023-09-21 NOTE — PROGRESS NOTES
Subjective:      Patient ID: Selma Hartmann is a 52 y.o. female.    Chief Complaint:  Dysfunctional Uterine Bleeding      History of Present Illness  This is a 52-year-old female had no periods for almost 3 years then had some vasomotor symptoms and was put on overall birth control pills subsequently last week started having some fairly heavy bleeding on pelvic    Vaginal Bleeding  This is a new problem. The current episode started in the past 7 days. The problem occurs constantly. The problem has been waxing and waning. The pain is mild. The problem affects both sides. She is not pregnant. Associated symptoms include abdominal pain, back pain and headaches. Pertinent negatives include no anorexia, chills, constipation, diarrhea, discolored urine, dysuria, fever, flank pain, frequency, hematuria, nausea, painful intercourse, rash, urgency or vomiting. The vaginal bleeding is heavier than menses. She has not been passing clots. She has not been passing tissue. The symptoms are aggravated by activity, bowel movements and urinating. She has tried nothing for the symptoms. The treatment provided no relief. She is not sexually active. No, her partner does not have an STD. She uses nothing for contraception. She is postmenopausal. There is no history of an abdominal surgery, a  section, an ectopic pregnancy, endometriosis, a gynecological surgery, herpes simplex, menorrhagia, metrorrhagia, miscarriage, ovarian cysts, perineal abscess, PID, an STD, a terminated pregnancy or vaginosis.         GYN & OB History  No LMP recorded. Patient is postmenopausal.   Date of Last Pap: No result found    OB History    Para Term  AB Living   3         3   SAB IAB Ectopic Multiple Live Births                  # Outcome Date GA Lbr Ayden/2nd Weight Sex Delivery Anes PTL Lv   3       Vag-Spont      2       Vag-Spont      1       Vag-Spont          Review of Systems  Review of Systems    Constitutional:  Negative for chills and fever.   Gastrointestinal:  Positive for abdominal pain. Negative for anorexia, constipation, diarrhea, nausea and vomiting.   Genitourinary:  Positive for vaginal bleeding. Negative for dysuria, flank pain, frequency, hematuria, menorrhagia and urgency.   Musculoskeletal:  Positive for back pain.   Integumentary:  Negative for rash.   Neurological:  Positive for headaches.          Objective:     Physical Exam:   Constitutional: She appears well-developed and well-nourished. No distress.    HENT:   Head: Normocephalic and atraumatic.    Eyes: Conjunctivae and EOM are normal.    Neck: No tracheal deviation present. No thyromegaly present.    Cardiovascular:  Normal rate.      Exam reveals no clubbing, no cyanosis and no edema.        Pulmonary/Chest: Effort normal. No respiratory distress.        Abdominal: Soft. She exhibits no distension and no mass. There is no abdominal tenderness. There is no rebound and no guarding. No hernia.     Genitourinary:    Vagina and rectum normal.      Pelvic exam was performed with patient supine.   There is no rash, tenderness, lesion or injury on the right labia. There is no rash, tenderness, lesion or injury on the left labia. Right adnexum displays no mass, no tenderness and no fullness. Left adnexum displays no mass, no tenderness and no fullness. Vaginal cuff normal.  No  no vaginal discharge in the vagina. Cervix is absent.Uterus is absent.    Genitourinary Comments: Uterus slightly irregular normal size moderate bleeding at                  Skin: Skin is warm and dry. She is not diaphoretic. No cyanosis. Nails show no clubbing.    Psychiatric: She has a normal mood and affect. Her behavior is normal. Judgment and thought content normal.         Assessment:     1. Screening for malignant neoplasm of the cervix    2. DUB (dysfunctional uterine bleeding)         Plan:   DC overall Provera 10 mg once once a day pelvic ultrasound oral  iron

## 2023-09-26 ENCOUNTER — OFFICE VISIT (OUTPATIENT)
Dept: OBSTETRICS AND GYNECOLOGY | Facility: CLINIC | Age: 53
End: 2023-09-26
Payer: MEDICARE

## 2023-09-26 ENCOUNTER — PROCEDURE VISIT (OUTPATIENT)
Dept: OBSTETRICS AND GYNECOLOGY | Facility: CLINIC | Age: 53
End: 2023-09-26
Payer: MEDICARE

## 2023-09-26 DIAGNOSIS — N93.8 DUB (DYSFUNCTIONAL UTERINE BLEEDING): Primary | ICD-10-CM

## 2023-09-26 DIAGNOSIS — N93.8 DUB (DYSFUNCTIONAL UTERINE BLEEDING): ICD-10-CM

## 2023-09-26 DIAGNOSIS — N95.0 POST-MENOPAUSAL BLEEDING: Primary | ICD-10-CM

## 2023-09-26 PROCEDURE — 99213 PR OFFICE/OUTPT VISIT, EST, LEVL III, 20-29 MIN: ICD-10-PCS | Mod: S$GLB,,, | Performed by: OBSTETRICS & GYNECOLOGY

## 2023-09-26 PROCEDURE — 99213 OFFICE O/P EST LOW 20 MIN: CPT | Mod: S$GLB,,, | Performed by: OBSTETRICS & GYNECOLOGY

## 2023-09-26 PROCEDURE — 76856 US OB/GYN PROCEDURE (VIEWPOINT): ICD-10-PCS | Mod: S$GLB,,, | Performed by: OBSTETRICS & GYNECOLOGY

## 2023-09-26 PROCEDURE — 76856 US EXAM PELVIC COMPLETE: CPT | Mod: S$GLB,,, | Performed by: OBSTETRICS & GYNECOLOGY

## 2023-09-26 RX ORDER — NORETHINDRONE ACETATE AND ETHINYL ESTRADIOL .5; 2.5 MG/1; UG/1
1 TABLET ORAL DAILY
Qty: 30 TABLET | Refills: 11 | Status: SHIPPED | OUTPATIENT
Start: 2023-09-26 | End: 2023-10-03

## 2023-09-26 NOTE — PROGRESS NOTES
S a 52-year-old female who had no periods for over year then took some overall and began having 1 heavy.  Ultrasound shows a 5 mm endometrium I did put her on Provera 10 mg a day for 12 days is having only spotting at this time discussed the results with her feel this is definitely from the overall.  I will recommend after she finishes the Provera wait a week then start FemHRT low will see how she does

## 2023-09-28 LAB — Lab: NORMAL

## 2023-10-02 ENCOUNTER — TELEPHONE (OUTPATIENT)
Dept: OBSTETRICS AND GYNECOLOGY | Facility: CLINIC | Age: 53
End: 2023-10-02
Payer: MEDICARE

## 2023-10-02 NOTE — TELEPHONE ENCOUNTER
----- Message from Mellissa Mustafa sent at 10/2/2023  2:40 PM CDT -----  Contact: self  Pt is concerned with medication that was recently sent to local pharmacy . Please call pt at [phone]

## 2023-10-03 RX ORDER — MEDROXYPROGESTERONE ACETATE 10 MG/1
10 TABLET ORAL
Qty: 10 TABLET | Refills: 0 | Status: SHIPPED | OUTPATIENT
Start: 2023-10-03 | End: 2023-10-11

## 2023-10-11 RX ORDER — MEDROXYPROGESTERONE ACETATE 10 MG/1
10 TABLET ORAL
Qty: 10 TABLET | Refills: 0 | Status: SHIPPED | OUTPATIENT
Start: 2023-10-11 | End: 2023-10-19

## 2023-10-19 RX ORDER — MEDROXYPROGESTERONE ACETATE 10 MG/1
10 TABLET ORAL
Qty: 10 TABLET | Refills: 0 | Status: SHIPPED | OUTPATIENT
Start: 2023-10-19

## 2023-12-26 ENCOUNTER — TELEPHONE (OUTPATIENT)
Dept: TRANSPLANT | Facility: CLINIC | Age: 53
End: 2023-12-26
Payer: MEDICARE

## 2023-12-26 NOTE — TELEPHONE ENCOUNTER
Approved on December 21  Request Reference Number: [id number]. ADEMPAS TAB 2.5MG is approved through 12/31/2024. Your patient may now fill this prescription and it will be covered.

## 2024-02-13 DIAGNOSIS — I27.20 PULMONARY HYPERTENSION: Primary | ICD-10-CM

## 2024-02-14 RX ORDER — RIOCIGUAT 2.5 MG/1
TABLET, FILM COATED ORAL
Qty: 90 TABLET | Refills: 11 | Status: SHIPPED | OUTPATIENT
Start: 2024-02-14

## 2024-03-26 ENCOUNTER — PATIENT MESSAGE (OUTPATIENT)
Dept: TRANSPLANT | Facility: CLINIC | Age: 54
End: 2024-03-26
Payer: MEDICARE

## 2024-04-02 DIAGNOSIS — I27.20 PULMONARY HYPERTENSION: Primary | ICD-10-CM

## 2024-04-02 DIAGNOSIS — Z79.899 POLYPHARMACY: ICD-10-CM

## 2024-04-02 DIAGNOSIS — I27.9 CHRONIC PULMONARY HEART DISEASE: ICD-10-CM

## 2024-04-25 RX ORDER — NORETHINDRONE ACETATE AND ETHINYL ESTRADIOL .5; 2.5 MG/1; UG/1
1 TABLET ORAL DAILY
Qty: 30 TABLET | Refills: 11 | Status: SHIPPED | OUTPATIENT
Start: 2024-04-25 | End: 2025-04-25

## 2024-04-30 ENCOUNTER — TELEPHONE (OUTPATIENT)
Dept: OBSTETRICS AND GYNECOLOGY | Facility: CLINIC | Age: 54
End: 2024-04-30
Payer: MEDICARE

## 2024-04-30 NOTE — TELEPHONE ENCOUNTER
----- Message from Nakita Parker sent at 4/30/2024 10:46 AM CDT -----  Contact: self  Patient is requesting a call back regarding hormone medication - insurance is not covering norethindrone ac-eth estradioL (FEMHRT LOW DOSE) 0.5-2.5 mg-mcg per tablet anymore, asking can something else be called out that her insurance will cover. Please call back at [phone]     Pharmacy -    [id number] - [address] [facility] [address] AT [place] [address] [address] [address]  Phone: [phone] Fax: [phone]

## 2024-04-30 NOTE — TELEPHONE ENCOUNTER
Advised pt that PA will be sent to pharmacy. Pt v/u. Advised she can take black marjorieash if PA not approved per Dr. Sanz.

## 2024-05-29 ENCOUNTER — TELEPHONE (OUTPATIENT)
Dept: TRANSPLANT | Facility: CLINIC | Age: 54
End: 2024-05-29
Payer: MEDICARE

## 2024-05-29 NOTE — TELEPHONE ENCOUNTER
----- Message from Lady Wya RN sent at 5/28/2024  9:43 AM CDT -----  Regarding: Reschedule appts from 5/23  Hi,     Please call patient to reschedule appts that she cancelled on 5/23.    Thanks,  Lady

## 2024-05-30 NOTE — TELEPHONE ENCOUNTER
----- Message from Lady Way RN sent at 5/28/2024  9:43 AM CDT -----  Regarding: Reschedule appts from 5/23  Hi,     Please call patient to reschedule appts that she cancelled on 5/23.    Thanks,  Lady

## 2024-05-30 NOTE — TELEPHONE ENCOUNTER
Spoke to Trini she states she can have the echo in [place] where the previous one was done in 2022 she is ok with that so I called to let the patient know but she did not answer so I left a voicemail for her to call me back with the info to send the order to the Cardiologist there.

## 2024-09-23 ENCOUNTER — TELEPHONE (OUTPATIENT)
Dept: OBSTETRICS AND GYNECOLOGY | Facility: CLINIC | Age: 54
End: 2024-09-23
Payer: MEDICARE

## 2024-09-23 NOTE — TELEPHONE ENCOUNTER
----- Message from Deepali Cr sent at 9/23/2024  3:19 PM CDT -----   Patient is calling in regards to will need prior authorization for medication please call her back at [phone]

## 2024-12-03 ENCOUNTER — TELEPHONE (OUTPATIENT)
Dept: TRANSPLANT | Facility: CLINIC | Age: 54
End: 2024-12-03
Payer: MEDICARE

## 2024-12-03 ENCOUNTER — PATIENT MESSAGE (OUTPATIENT)
Dept: TRANSPLANT | Facility: CLINIC | Age: 54
End: 2024-12-03
Payer: MEDICARE

## 2024-12-03 NOTE — TELEPHONE ENCOUNTER
NN called and LVM for patient to call back. Patient has not been reachable but has also not been seen in clinic. Kendra had sent a message that patient has not been feeling good so NN was trying to reach out to discuss but there was no answer. Message will be sent via portal.

## 2024-12-11 ENCOUNTER — TELEPHONE (OUTPATIENT)
Dept: TRANSPLANT | Facility: CLINIC | Age: 54
End: 2024-12-11
Payer: MEDICARE

## 2024-12-13 ENCOUNTER — OFFICE VISIT (OUTPATIENT)
Dept: TRANSPLANT | Facility: CLINIC | Age: 54
End: 2024-12-13
Payer: MEDICARE

## 2024-12-13 DIAGNOSIS — I27.21 WHO GROUP 1 PULMONARY ARTERIAL HYPERTENSION: Primary | ICD-10-CM

## 2024-12-13 DIAGNOSIS — R06.02 SHORTNESS OF BREATH ON EXERTION: ICD-10-CM

## 2024-12-13 DIAGNOSIS — G47.33 OSA ON CPAP: ICD-10-CM

## 2024-12-13 DIAGNOSIS — I10 ESSENTIAL HYPERTENSION: ICD-10-CM

## 2024-12-13 DIAGNOSIS — F15.10 METHAMPHETAMINE USE: ICD-10-CM

## 2024-12-13 PROCEDURE — 99214 OFFICE O/P EST MOD 30 MIN: CPT | Mod: 95,,,

## 2024-12-17 ENCOUNTER — TELEPHONE (OUTPATIENT)
Dept: TRANSPLANT | Facility: CLINIC | Age: 54
End: 2024-12-17
Payer: MEDICARE

## 2024-12-17 LAB
EXT ALT: 9
EXT AST: 24
EXT BUN: 14
EXT CREATININE: 1.08 MG/DL
EXT HEMATOCRIT: 49.8
EXT HEMOGLOBIN: 16.7
EXT MAGNESIUM: 2.4
EXT PLATELETS: 211
EXT POTASSIUM: 3.3
EXT SODIUM: 140 MMOL/L
EXT WBC: 8

## 2024-12-17 RX ORDER — SPIRONOLACTONE 25 MG/1
25 TABLET ORAL DAILY
Qty: 30 TABLET | Refills: 11 | Status: SHIPPED | OUTPATIENT
Start: 2024-12-17

## 2024-12-17 NOTE — TELEPHONE ENCOUNTER
NN called and spoke with patient about labs. Per STANLEY Feliz she would like to restart Spironolactone and have patient repeat labs next week. Patient verbalized understanding and will go get labs on 12/26.

## 2024-12-17 NOTE — PROGRESS NOTES
The patient location is: Home  The chief complaint leading to consultation is: Pulmonary Hypertension    Visit type: audiovisual    Face to Face time with patient: 25  40 minutes of total time spent on the encounter, which includes face to face time and non-face to face time preparing to see the patient (eg, review of tests), Obtaining and/or reviewing separately obtained history, Documenting clinical information in the electronic or other health record, Independently interpreting results (not separately reported) and communicating results to the patient/family/caregiver, or Care coordination (not separately reported).       Each patient to whom he or she provides medical services by telemedicine is:  (1) informed of the relationship between the physician and patient and the respective role of any other health care provider with respect to management of the patient; and (2) notified that he or she may decline to receive medical services by telemedicine and may withdraw from such care at any time.    Subjective:    Patient ID:  Selma Hartmann is a 54 y.o. female who presents for follow-up of Pulmonary Hypertension.    HPI     Mrs. Hartmann has WHO Group 1 PAH diagnosed by right heart cath. She is currently on  adempas 2.5 mg, TID. She has a history methamphetamine use (urine tox + OSH 12/3/19), Marijuana use, tob use HTN, DM2, HLP, h/o prescription opiate addiction, anxiety, h/o DVT after tubal ligation 12 yr ago,   Referred by Dr Alexander. Has remote hx of stroke in 2007 - had to learn to walk and talk again.    Mrs. Hartmann has not been doing well. She was hospitalized in September for SOB and volume. Feels better since then. Has been feeling more short of breath, in general. Reports gaining about 40lbs over the last few months. She struggles with carrying things and bending over. She denies pre-syncope and syncope. Mrs. Hartmann is experiencing PND, gasping for air at night. She had not been using  CPAP, but started doing so, recently. Now her tongue is swollen in the mornings. She has not had her settings adjusted in years. Endorses taking Adempas, 2.5mg, TID.  Patient denies chest pain, chest pressure, syncope, pre-syncope, lightheadedness, dizziness, abdominal pain, abdominal pressure, or N/V/F/C.      6MWT:  Last walk was 6/2023 => 320m, RA    ECHO: 12/4/2024      ECHO: 9/6/2022 - In media tab (PASP 39, Normal RV size/function, Grade 1 DDx)      ECHO: 2/26/2021  The left ventricle is normal in size with normal systolic function. The estimated ejection fraction is 65%  Normal left ventricular diastolic function.  There is abnormal septal wall motion.  Mild right ventricular enlargement with low normal right ventricular systolic function.  Mild tricuspid regurgitation.  Mild left atrial enlargement.  Normal central venous pressure (3 mmHg).  The estimated PA systolic pressure is 32 mmHg.  Very poor TR envelope    ECHO 12/2/2019  Normal LV size, LV thickness mild-mod increased, EF 55-60%  E/A reversal  RV severely dilated, D shaped septum  Normal LA  Mild ISSA  RVSP 110  No effusion  RAP 15    RHC: 5/26/2023  RA 5  RV 34/5  PA  34/16/22  PCWP 7  PA saturation 70%  Ao saturation 95%  CO/CI 4.1/2.26  PVR 3.7 SHELTON, consistent with pre-capillary pulmonary arterial hypertension  SVR 1997  BP  136/93/107  HR 65 SR  Hb 17.3    RHC: 11/9/2021  RA: 5 PA: 45/ 20 PWP: 10 .   Cardiac output was 5.6. Cardiac index is 3.1 L/min/m2.   RHC performed via the right IJ. Patient tolerated the procedure well. Normal left and right side filling pressures (RA= 5, PCWP=10 mm of Hg). Mild pulmonary HTN  (PA=45/20 mm of Hg, PA mean=33 mm of Hg. Normal cardiac index and output  (CI=3.1 L/min/2, CO=5.6 L/min).    RHC 12/23/19  Severe Pulmonary arterial hypertension (treatment naive)  Normal right and left-sided filling pressures.  Normal cardiac output and index by Mil method.  No response to inhaled nitric oxide.  RA: 8/ 7/ 5 RV: 75/ -5/  4 PA: 83/ 35/ 51 PWP: 12/ 0/ 10 . Cardiac output was 4.35 by Mil. Cardiac index is 2.44 L/min/m2. O2 Sat: PA 69%. Pulmonary vascular resistance: 9.4. /85 AO sat (92%) Bryanna 20 ppm PA 78/32 (47) 40 ppm PA 72/30 (45) 80 ppm PA 72/30 (45)    Updated testin2024  CT CHEST - No acute airspace opacities. PA enlarged. Hepatic steatosis  PFTS - Normal lung volumes, mild diffusion impairment    CT Chest  12  /  3  /   19  (-) PE, main PA 3.5 cm, normal lung parenchyma    PFTs         FVC    3.3  L  98    % pred  FEV1   2.47 L   92  % pred  FEV1/FVC  75    %  TLC  3.99 L 84 %pred  DLCOadj   73  % pred       Review of Systems   Constitutional: Positive for malaise/fatigue and weight gain. Negative for fever.   HENT: Negative.     Cardiovascular:  Positive for dyspnea on exertion, leg swelling, orthopnea and paroxysmal nocturnal dyspnea. Negative for chest pain, near-syncope and syncope.   Respiratory:  Positive for shortness of breath and sleep disturbances due to breathing (CPAP). Negative for cough and wheezing.    Endocrine: Negative.    Hematologic/Lymphatic: Negative.    Skin: Negative.    Musculoskeletal: Negative.    Gastrointestinal:  Positive for bloating.   Genitourinary: Negative.    Neurological: Negative.    Psychiatric/Behavioral:  Negative for altered mental status. The patient is nervous/anxious. The patient does not have insomnia.      External labs - Cr 1.08, BUN 14, K 3.3, NT-Pro       WHO Group: 1 Functional Class: II REVEAL Score: 5 (Low Risk)  Assessment/Plan:   Selma Hartmann was seen today for pulmonary hypertension.    Diagnoses and all orders for this visit:    WHO group 1 pulmonary arterial hypertension  Mrs. Hartmann's cath from last year showed mild PH on monotherapy. Recent ECHO is concerning for severe PAH. Symptoms correlate with right heart failure. Will need a RHC to evaluate hemodynamics. Mrs. Hartmann is agreeable to coming to [place] for updated  testing. Has been difficult for her to get here and to complete updated testing and subsequently advance therapy. Reinforced need to stay on top of these things, particularly with a progressive disease.     Need 6 minute walk for functional status and O2 requirements. Will obtain V/Q as it was not part of original workup. (These two tests can be done locally or here)    Reviewed labs and seems like fluid is better managed. Would like her to restart spironolactone and have ordered this medication.    -     NM Lung Ventilation Perfusion Imaging; Future  -     Case Request-Cath Lab: INSERTION, CATHETER, RIGHT HEART  -     Comprehensive Metabolic Panel; Future  -     CBC Auto Differential; Future  -     Six Minute Walk Test to qualify for Home Oxygen; Future    Shortness of breath on exertion  -     CBC Auto Differential; Future    LISA on CPAP  Concerned that she is using old settings. She is having difficulty speaking because of the swelling in her tongue, but in no acute distress. No other triggers noted and she says the swelling eventually recedes. Highly recommend appt with sleep med doctor and titration study.    Essential hypertension    Methamphetamine use  Denies current use, but could be a cause of rapid progression.    Other orders  -     Cancel: Case Request-Cath Lab: INSERTION, CATHETER, RIGHT HEART  -     Cancel: Comprehensive Metabolic Panel; Future  -     spironolactone (ALDACTONE) 25 MG tablet; Take 1 tablet (25 mg total) by mouth once daily.    Kell Felix DNP, PRISCA  [facility]

## 2024-12-19 ENCOUNTER — PATIENT MESSAGE (OUTPATIENT)
Dept: TRANSPLANT | Facility: CLINIC | Age: 54
End: 2024-12-19
Payer: MEDICARE

## 2024-12-26 ENCOUNTER — PATIENT MESSAGE (OUTPATIENT)
Dept: TRANSPLANT | Facility: CLINIC | Age: 54
End: 2024-12-26
Payer: MEDICARE

## 2025-01-02 ENCOUNTER — TELEPHONE (OUTPATIENT)
Dept: TRANSPLANT | Facility: CLINIC | Age: 55
End: 2025-01-02
Payer: MEDICARE

## 2025-01-03 ENCOUNTER — PATIENT MESSAGE (OUTPATIENT)
Dept: TRANSPLANT | Facility: CLINIC | Age: 55
End: 2025-01-03
Payer: MEDICARE

## 2025-01-06 ENCOUNTER — TELEPHONE (OUTPATIENT)
Dept: TRANSPLANT | Facility: CLINIC | Age: 55
End: 2025-01-06
Payer: MEDICARE

## 2025-01-06 LAB
EXT ALT: 14
EXT AST: 26
EXT BUN: 15
EXT CHLORIDE: 99
EXT CREATININE: 1.2 MG/DL
EXT POTASSIUM: 3.5
EXT SODIUM: 142 MMOL/L
NT-PROBNP: 332

## 2025-02-18 ENCOUNTER — PATIENT MESSAGE (OUTPATIENT)
Dept: TRANSPLANT | Facility: CLINIC | Age: 55
End: 2025-02-18
Payer: MEDICARE

## 2025-02-19 ENCOUNTER — TELEPHONE (OUTPATIENT)
Dept: TRANSPLANT | Facility: CLINIC | Age: 55
End: 2025-02-19
Payer: MEDICARE

## 2025-02-19 NOTE — TELEPHONE ENCOUNTER
Nn tried to reach out to patient to discuss next steps. Patient never called back to schedule RHC or other testing. Patient also had option of getting some testing done locally. LVM to call back.

## 2025-02-26 ENCOUNTER — PATIENT MESSAGE (OUTPATIENT)
Dept: TRANSPLANT | Facility: CLINIC | Age: 55
End: 2025-02-26
Payer: MEDICARE

## 2025-02-26 ENCOUNTER — TELEPHONE (OUTPATIENT)
Dept: TRANSPLANT | Facility: CLINIC | Age: 55
End: 2025-02-26
Payer: MEDICARE

## 2025-02-26 NOTE — TELEPHONE ENCOUNTER
NN tried to reach out to patient again to discuss testing and scheduling but patient has not returned calls. 2nd attempt.

## 2025-03-05 ENCOUNTER — TELEPHONE (OUTPATIENT)
Dept: TRANSPLANT | Facility: CLINIC | Age: 55
End: 2025-03-05
Payer: MEDICARE

## 2025-03-05 NOTE — TELEPHONE ENCOUNTER
NN called and spoke with [facility] about patients Adempas to change prescriber information. Per [facility], patient has not refilled Adempas since 1/2025 and Dr. Santillan tried prescribing Winrevair in 1/2025 but it was denied so it is pending appeal. Patient has been unresponsive to phone calls and portal messages. NN spoke with STANLEY Feliz who agrees to hold Adempas until patient responds or is able to come to appointments. Also, patient should not have 2 MD's prescribing medications for PH so STANLEY Feliz will not prescribe. [facility] will have patient call office if she reaches out to schedule shipment.

## 2025-03-13 ENCOUNTER — PATIENT MESSAGE (OUTPATIENT)
Dept: TRANSPLANT | Facility: CLINIC | Age: 55
End: 2025-03-13
Payer: MEDICARE

## 2025-04-28 ENCOUNTER — PATIENT MESSAGE (OUTPATIENT)
Dept: TRANSPLANT | Facility: CLINIC | Age: 55
End: 2025-04-28
Payer: MEDICARE

## 2025-06-25 ENCOUNTER — PATIENT MESSAGE (OUTPATIENT)
Dept: TRANSPLANT | Facility: CLINIC | Age: 55
End: 2025-06-25
Payer: MEDICARE

## (undated) DEVICE — CATH SWAN GANZ STND 7FR

## (undated) DEVICE — KIT MICROINTRODUCE MINI 5X10CM

## (undated) DEVICE — KIT PROBE COVER WITH GEL

## (undated) DEVICE — SHEATH INTRODUCER 7FR 11CM

## (undated) DEVICE — SEE MEDLINE ITEM 156894

## (undated) DEVICE — SET MICROPUNCTURE 5FR 501NT

## (undated) DEVICE — DRESSING TRANS 4X4 TEGADERM

## (undated) DEVICE — TRAY CATH LAB OMC